# Patient Record
Sex: FEMALE | Race: WHITE | NOT HISPANIC OR LATINO | Employment: OTHER | URBAN - NONMETROPOLITAN AREA
[De-identification: names, ages, dates, MRNs, and addresses within clinical notes are randomized per-mention and may not be internally consistent; named-entity substitution may affect disease eponyms.]

---

## 2022-06-22 ENCOUNTER — APPOINTMENT (EMERGENCY)
Dept: RADIOLOGY | Facility: HOSPITAL | Age: 52
DRG: 720 | End: 2022-06-22
Payer: COMMERCIAL

## 2022-06-22 ENCOUNTER — HOSPITAL ENCOUNTER (INPATIENT)
Facility: HOSPITAL | Age: 52
LOS: 3 days | Discharge: HOME | DRG: 720 | End: 2022-06-27
Attending: EMERGENCY MEDICINE | Admitting: INTERNAL MEDICINE
Payer: COMMERCIAL

## 2022-06-22 DIAGNOSIS — L03.90 CELLULITIS: ICD-10-CM
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
## 2022-06-22 DIAGNOSIS — S61.451A DOG BITE OF HAND, RIGHT, INITIAL ENCOUNTER: Primary | ICD-10-CM

## 2022-06-22 PROBLEM — F31.9 BIPOLAR DISORDER (HCC): Status: ACTIVE | Noted: 2022-06-22

## 2022-06-22 PROBLEM — L08.9 DOG BITE OF RIGHT HAND WITH INFECTION: Status: ACTIVE | Noted: 2022-06-22

## 2022-06-22 LAB
ANION GAP SERPL CALCULATED.3IONS-SCNC: 11 MMOL/L (ref 4–13)
BASOPHILS # BLD AUTO: 0.02 THOUSANDS/ΜL (ref 0–0.1)
BASOPHILS NFR BLD AUTO: 0 % (ref 0–1)
BUN SERPL-MCNC: 16 MG/DL (ref 5–25)
CALCIUM SERPL-MCNC: 9.2 MG/DL (ref 8.3–10.1)
CHLORIDE SERPL-SCNC: 102 MMOL/L (ref 100–108)
CO2 SERPL-SCNC: 23 MMOL/L (ref 21–32)
CREAT SERPL-MCNC: 1.38 MG/DL (ref 0.6–1.3)
EOSINOPHIL # BLD AUTO: 0 THOUSAND/ΜL (ref 0–0.61)
EOSINOPHIL NFR BLD AUTO: 0 % (ref 0–6)
ERYTHROCYTE [DISTWIDTH] IN BLOOD BY AUTOMATED COUNT: 12.8 % (ref 11.6–15.1)
GFR SERPL CREATININE-BSD FRML MDRD: 44 ML/MIN/1.73SQ M
GLUCOSE P FAST SERPL-MCNC: 100 MG/DL (ref 65–99)
GLUCOSE SERPL-MCNC: 100 MG/DL (ref 65–140)
HCT VFR BLD AUTO: 42.4 % (ref 34.8–46.1)
HGB BLD-MCNC: 14.8 G/DL (ref 11.5–15.4)
IMM GRANULOCYTES # BLD AUTO: 0.09 THOUSAND/UL (ref 0–0.2)
IMM GRANULOCYTES NFR BLD AUTO: 1 % (ref 0–2)
LYMPHOCYTES # BLD AUTO: 1.38 THOUSANDS/ΜL (ref 0.6–4.47)
LYMPHOCYTES NFR BLD AUTO: 12 % (ref 14–44)
MCH RBC QN AUTO: 31.2 PG (ref 26.8–34.3)
MCHC RBC AUTO-ENTMCNC: 34.9 G/DL (ref 31.4–37.4)
MCV RBC AUTO: 90 FL (ref 82–98)
MONOCYTES # BLD AUTO: 0.92 THOUSAND/ΜL (ref 0.17–1.22)
MONOCYTES NFR BLD AUTO: 8 % (ref 4–12)
NEUTROPHILS # BLD AUTO: 9.22 THOUSANDS/ΜL (ref 1.85–7.62)
NEUTS SEG NFR BLD AUTO: 79 % (ref 43–75)
NRBC BLD AUTO-RTO: 0 /100 WBCS
PLATELET # BLD AUTO: 212 THOUSANDS/UL (ref 149–390)
PMV BLD AUTO: 8.7 FL (ref 8.9–12.7)
POTASSIUM SERPL-SCNC: 3.9 MMOL/L (ref 3.5–5.3)
RBC # BLD AUTO: 4.74 MILLION/UL (ref 3.81–5.12)
SODIUM SERPL-SCNC: 136 MMOL/L (ref 136–145)
WBC # BLD AUTO: 11.63 THOUSAND/UL (ref 4.31–10.16)

## 2022-06-22 PROCEDURE — 85025 COMPLETE CBC W/AUTO DIFF WBC: CPT | Performed by: PHYSICIAN ASSISTANT

## 2022-06-22 PROCEDURE — 80048 BASIC METABOLIC PNL TOTAL CA: CPT | Performed by: PHYSICIAN ASSISTANT

## 2022-06-22 PROCEDURE — 90715 TDAP VACCINE 7 YRS/> IM: CPT | Performed by: PHYSICIAN ASSISTANT

## 2022-06-22 PROCEDURE — 99219 PR INITIAL OBSERVATION CARE/DAY 50 MINUTES: CPT

## 2022-06-22 PROCEDURE — 99285 EMERGENCY DEPT VISIT HI MDM: CPT | Performed by: PHYSICIAN ASSISTANT

## 2022-06-22 PROCEDURE — 36415 COLL VENOUS BLD VENIPUNCTURE: CPT | Performed by: PHYSICIAN ASSISTANT

## 2022-06-22 PROCEDURE — 73130 X-RAY EXAM OF HAND: CPT

## 2022-06-22 PROCEDURE — 99284 EMERGENCY DEPT VISIT MOD MDM: CPT

## 2022-06-22 RX ORDER — OXYCODONE HYDROCHLORIDE 5 MG/1
5 TABLET ORAL EVERY 4 HOURS PRN
Status: DISCONTINUED | OUTPATIENT
Start: 2022-06-22 | End: 2022-06-27 | Stop reason: HOSPADM

## 2022-06-22 RX ORDER — ARIPIPRAZOLE 30 MG/1
30 TABLET ORAL DAILY
COMMUNITY

## 2022-06-22 RX ORDER — NICOTINE 21 MG/24HR
1 PATCH, TRANSDERMAL 24 HOURS TRANSDERMAL DAILY
Status: DISCONTINUED | OUTPATIENT
Start: 2022-06-23 | End: 2022-06-27 | Stop reason: HOSPADM

## 2022-06-22 RX ORDER — ACETAMINOPHEN 325 MG/1
650 TABLET ORAL EVERY 6 HOURS PRN
Status: DISCONTINUED | OUTPATIENT
Start: 2022-06-22 | End: 2022-06-22

## 2022-06-22 RX ORDER — GABAPENTIN 100 MG/1
100 CAPSULE ORAL 2 TIMES DAILY
Status: DISCONTINUED | OUTPATIENT
Start: 2022-06-22 | End: 2022-06-27 | Stop reason: HOSPADM

## 2022-06-22 RX ORDER — GABAPENTIN 100 MG/1
100 CAPSULE ORAL 3 TIMES DAILY
COMMUNITY

## 2022-06-22 RX ORDER — ARIPIPRAZOLE 10 MG/1
30 TABLET ORAL DAILY
Status: DISCONTINUED | OUTPATIENT
Start: 2022-06-23 | End: 2022-06-27 | Stop reason: HOSPADM

## 2022-06-22 RX ORDER — ONDANSETRON 2 MG/ML
4 INJECTION INTRAMUSCULAR; INTRAVENOUS EVERY 6 HOURS PRN
Status: DISCONTINUED | OUTPATIENT
Start: 2022-06-22 | End: 2022-06-27 | Stop reason: HOSPADM

## 2022-06-22 RX ORDER — DESVENLAFAXINE 50 MG/1
50 TABLET, EXTENDED RELEASE ORAL DAILY
Status: DISCONTINUED | OUTPATIENT
Start: 2022-06-23 | End: 2022-06-27 | Stop reason: HOSPADM

## 2022-06-22 RX ORDER — HYDROXYZINE PAMOATE 50 MG/1
50 CAPSULE ORAL 3 TIMES DAILY PRN
COMMUNITY

## 2022-06-22 RX ORDER — ACETAMINOPHEN 325 MG/1
650 TABLET ORAL EVERY 4 HOURS PRN
Status: DISCONTINUED | OUTPATIENT
Start: 2022-06-22 | End: 2022-06-27 | Stop reason: HOSPADM

## 2022-06-22 RX ORDER — TOPIRAMATE 50 MG/1
50 TABLET, FILM COATED ORAL
COMMUNITY

## 2022-06-22 RX ORDER — HYDROXYZINE HYDROCHLORIDE 25 MG/1
50 TABLET, FILM COATED ORAL EVERY 6 HOURS PRN
Status: DISCONTINUED | OUTPATIENT
Start: 2022-06-22 | End: 2022-06-27 | Stop reason: HOSPADM

## 2022-06-22 RX ORDER — OXYCODONE HYDROCHLORIDE 10 MG/1
10 TABLET ORAL EVERY 4 HOURS PRN
Status: DISCONTINUED | OUTPATIENT
Start: 2022-06-22 | End: 2022-06-27 | Stop reason: HOSPADM

## 2022-06-22 RX ORDER — HEPARIN SODIUM 5000 [USP'U]/ML
5000 INJECTION, SOLUTION INTRAVENOUS; SUBCUTANEOUS EVERY 8 HOURS SCHEDULED
Status: DISCONTINUED | OUTPATIENT
Start: 2022-06-22 | End: 2022-06-27 | Stop reason: HOSPADM

## 2022-06-22 RX ORDER — KETOROLAC TROMETHAMINE 30 MG/ML
15 INJECTION, SOLUTION INTRAMUSCULAR; INTRAVENOUS ONCE
Status: COMPLETED | OUTPATIENT
Start: 2022-06-22 | End: 2022-06-22

## 2022-06-22 RX ORDER — TOPIRAMATE 100 MG/1
100 TABLET, FILM COATED ORAL DAILY
COMMUNITY

## 2022-06-22 RX ORDER — DESVENLAFAXINE 50 MG/1
50 TABLET, EXTENDED RELEASE ORAL DAILY
COMMUNITY

## 2022-06-22 RX ADMIN — TETANUS TOXOID, REDUCED DIPHTHERIA TOXOID AND ACELLULAR PERTUSSIS VACCINE, ADSORBED 0.5 ML: 5; 2.5; 8; 8; 2.5 SUSPENSION INTRAMUSCULAR at 21:12

## 2022-06-22 RX ADMIN — SODIUM CHLORIDE 1000 ML: 0.9 INJECTION, SOLUTION INTRAVENOUS at 20:52

## 2022-06-22 RX ADMIN — GABAPENTIN 100 MG: 100 CAPSULE ORAL at 21:41

## 2022-06-22 RX ADMIN — HEPARIN SODIUM 5000 UNITS: 5000 INJECTION INTRAVENOUS; SUBCUTANEOUS at 22:23

## 2022-06-22 RX ADMIN — KETOROLAC TROMETHAMINE 15 MG: 30 INJECTION, SOLUTION INTRAMUSCULAR at 20:48

## 2022-06-22 RX ADMIN — AMPICILLIN SODIUM AND SULBACTAM SODIUM 3 G: 2; 1 INJECTION, POWDER, FOR SOLUTION INTRAMUSCULAR; INTRAVENOUS at 20:59

## 2022-06-23 PROBLEM — A41.9 SEPSIS (HCC): Status: ACTIVE | Noted: 2022-06-23

## 2022-06-23 LAB
ALBUMIN SERPL BCP-MCNC: 3.1 G/DL (ref 3.5–5)
ALP SERPL-CCNC: 61 U/L (ref 46–116)
ALT SERPL W P-5'-P-CCNC: 26 U/L (ref 12–78)
ANION GAP SERPL CALCULATED.3IONS-SCNC: 9 MMOL/L (ref 4–13)
AST SERPL W P-5'-P-CCNC: 24 U/L (ref 5–45)
BASOPHILS # BLD AUTO: 0.01 THOUSANDS/ΜL (ref 0–0.1)
BASOPHILS NFR BLD AUTO: 0 % (ref 0–1)
BILIRUB SERPL-MCNC: 0.58 MG/DL (ref 0.2–1)
BUN SERPL-MCNC: 15 MG/DL (ref 5–25)
CALCIUM ALBUM COR SERPL-MCNC: 9.3 MG/DL (ref 8.3–10.1)
CALCIUM SERPL-MCNC: 8.6 MG/DL (ref 8.3–10.1)
CHLORIDE SERPL-SCNC: 108 MMOL/L (ref 100–108)
CO2 SERPL-SCNC: 21 MMOL/L (ref 21–32)
CREAT SERPL-MCNC: 1.11 MG/DL (ref 0.6–1.3)
EOSINOPHIL # BLD AUTO: 0 THOUSAND/ΜL (ref 0–0.61)
EOSINOPHIL NFR BLD AUTO: 0 % (ref 0–6)
ERYTHROCYTE [DISTWIDTH] IN BLOOD BY AUTOMATED COUNT: 12.9 % (ref 11.6–15.1)
GFR SERPL CREATININE-BSD FRML MDRD: 58 ML/MIN/1.73SQ M
GLUCOSE SERPL-MCNC: 110 MG/DL (ref 65–140)
HCT VFR BLD AUTO: 38.5 % (ref 34.8–46.1)
HGB BLD-MCNC: 13.3 G/DL (ref 11.5–15.4)
IMM GRANULOCYTES # BLD AUTO: 0.02 THOUSAND/UL (ref 0–0.2)
IMM GRANULOCYTES NFR BLD AUTO: 0 % (ref 0–2)
LYMPHOCYTES # BLD AUTO: 1.77 THOUSANDS/ΜL (ref 0.6–4.47)
LYMPHOCYTES NFR BLD AUTO: 21 % (ref 14–44)
MCH RBC QN AUTO: 31.1 PG (ref 26.8–34.3)
MCHC RBC AUTO-ENTMCNC: 34.5 G/DL (ref 31.4–37.4)
MCV RBC AUTO: 90 FL (ref 82–98)
MONOCYTES # BLD AUTO: 0.74 THOUSAND/ΜL (ref 0.17–1.22)
MONOCYTES NFR BLD AUTO: 9 % (ref 4–12)
NEUTROPHILS # BLD AUTO: 5.72 THOUSANDS/ΜL (ref 1.85–7.62)
NEUTS SEG NFR BLD AUTO: 70 % (ref 43–75)
NRBC BLD AUTO-RTO: 0 /100 WBCS
PLATELET # BLD AUTO: 175 THOUSANDS/UL (ref 149–390)
PMV BLD AUTO: 9.3 FL (ref 8.9–12.7)
POTASSIUM SERPL-SCNC: 4 MMOL/L (ref 3.5–5.3)
PROT SERPL-MCNC: 6.6 G/DL (ref 6.4–8.2)
RBC # BLD AUTO: 4.27 MILLION/UL (ref 3.81–5.12)
SODIUM SERPL-SCNC: 138 MMOL/L (ref 136–145)
WBC # BLD AUTO: 8.26 THOUSAND/UL (ref 4.31–10.16)

## 2022-06-23 PROCEDURE — 99225 PR SBSQ OBSERVATION CARE/DAY 25 MINUTES: CPT | Performed by: PHYSICIAN ASSISTANT

## 2022-06-23 PROCEDURE — 80053 COMPREHEN METABOLIC PANEL: CPT

## 2022-06-23 PROCEDURE — 36415 COLL VENOUS BLD VENIPUNCTURE: CPT

## 2022-06-23 PROCEDURE — 85025 COMPLETE CBC W/AUTO DIFF WBC: CPT

## 2022-06-23 RX ADMIN — ARIPIPRAZOLE 30 MG: 10 TABLET ORAL at 08:03

## 2022-06-23 RX ADMIN — AMPICILLIN SODIUM AND SULBACTAM SODIUM 3 G: 2; 1 INJECTION, POWDER, FOR SOLUTION INTRAMUSCULAR; INTRAVENOUS at 18:12

## 2022-06-23 RX ADMIN — GABAPENTIN 100 MG: 100 CAPSULE ORAL at 18:12

## 2022-06-23 RX ADMIN — OXYCODONE HYDROCHLORIDE 5 MG: 5 TABLET ORAL at 21:04

## 2022-06-23 RX ADMIN — DESVENLAFAXINE 50 MG: 50 TABLET, FILM COATED, EXTENDED RELEASE ORAL at 08:03

## 2022-06-23 RX ADMIN — ACETAMINOPHEN 650 MG: 325 TABLET ORAL at 10:19

## 2022-06-23 RX ADMIN — AMPICILLIN SODIUM AND SULBACTAM SODIUM 3 G: 2; 1 INJECTION, POWDER, FOR SOLUTION INTRAMUSCULAR; INTRAVENOUS at 02:50

## 2022-06-23 RX ADMIN — AMPICILLIN SODIUM AND SULBACTAM SODIUM 3 G: 2; 1 INJECTION, POWDER, FOR SOLUTION INTRAMUSCULAR; INTRAVENOUS at 09:11

## 2022-06-23 RX ADMIN — HEPARIN SODIUM 5000 UNITS: 5000 INJECTION INTRAVENOUS; SUBCUTANEOUS at 14:02

## 2022-06-23 RX ADMIN — HEPARIN SODIUM 5000 UNITS: 5000 INJECTION INTRAVENOUS; SUBCUTANEOUS at 21:05

## 2022-06-23 RX ADMIN — TOPIRAMATE 150 MG: 100 TABLET, FILM COATED ORAL at 08:03

## 2022-06-23 RX ADMIN — NICOTINE 1 PATCH: 21 PATCH, EXTENDED RELEASE TRANSDERMAL at 08:02

## 2022-06-23 RX ADMIN — GABAPENTIN 100 MG: 100 CAPSULE ORAL at 08:03

## 2022-06-23 RX ADMIN — HEPARIN SODIUM 5000 UNITS: 5000 INJECTION INTRAVENOUS; SUBCUTANEOUS at 06:29

## 2022-06-23 NOTE — UTILIZATION REVIEW
OBSERVATION 6/22/22 @ 2045 CONVERTED TO INPATIENT [email or web address] DUE TO HAND INFECTION FROM DOG BIT, WITH SEPSIS REQUIRING IV AB  Initial Clinical Review    Admission: Date/Time/Statement:   Admission Orders (From admission, onward)     Ordered        06/24/22 1517  Inpatient Admission  Once                      Orders Placed This Encounter   Procedures    Inpatient Admission     Standing Status:   Standing     Number of Occurrences:   1     Order Specific Question:   Level of Care     Answer:   Med Surg [16]     Order Specific Question:   Estimated length of stay     Answer:   More than 2 Midnights     Order Specific Question:   Certification     Answer:   I certify that inpatient services are medically necessary for this patient for a duration of greater than two midnights  See H&P and MD Progress Notes for additional information about the patient's course of treatment  ED Arrival Information     Expected   -    Arrival   6/22/2022 20:09    Acuity   Urgent            Means of arrival   Walk-In    Escorted by   Family Member    Service   Hospitalist    Admission type   Urgent            Arrival complaint   dog bite            Chief Complaint   Patient presents with    Dog Bite     Bit by her own dog this morning  Dog UTD on all vaccinations and rabbies  Patient did clean wound generously with water and betadine       Initial Presentation: 54 y o  female the ED from home with complaints of wound to right hand from breaking up dog fight which happened the morning of her arrival   Redness increased throughout the day  Difficulty with ROM  Dog UTD with vaccinations  Admitted under observation then converted to inpatient  for dog bite right hand with infection  WBCs elevated  IV abx started  Warm soaks  Date:   6/23   Hand wound feels mildly better, remains red with significant pain and unable to move  Continue with Iv abx    6/24 Update: Intermittent tachycardia    Continue with pain medication and IV abx    ED Triage Vitals   Temperature Pulse Respirations Blood Pressure SpO2   06/22/22 2020 06/22/22 2019 06/22/22 2019 06/22/22 2019 06/22/22 2019   98 °F (36 7 °C) 100 18 137/95 98 %      Temp Source Heart Rate Source Patient Position - Orthostatic VS BP Location FiO2 (%)   06/23/22 1348 -- 06/22/22 2019 06/22/22 2019 --   Temporal  Sitting Left arm       Pain Score       06/22/22 2048       8          Wt Readings from Last 1 Encounters:   06/24/22 68 kg (149 lb 14 6 oz)     Additional Vital Signs:   Time Temp Pulse Resp BP MAP (mmHg) SpO2 O2 Device Patient Position - Orthostatic VS   06/24/22 1300 98 °F (36 7 °C) -- -- 118/72 -- -- None (Room air) Lying   06/24/22 1238 -- -- -- -- -- -- None (Room air) --   06/24/22 07:05:45 98 °F (36 7 °C) 115 Abnormal  18 119/76 90 98 % None (Room air) Lying   06/23/22 2330 -- -- -- -- -- -- None (Room air) --   06/23/22 22:08:57 98 1 °F (36 7 °C) 103 18 114/72 86 98 % None (Room air)        /Time Temp Pulse Resp BP SpO2 O2 Device Patient Position - Orthostatic VS   06/23/22 0800 99 1 °F (37 3 °C) 86 18 132/84 97 % None (Room air) Lying   06/22/22 2020 98 °F (36 7 °C) -- -- -- -- -- --   06/22/22 2019 -- 100 18 137/95 98 % None (Room air) Sitting       Pertinent Labs/Diagnostic Test Results:     XR hand 3+ views RIGHT   ED Interpretation by Adolfo Menjivar PA-C (06/22 2100)   No fracture dislocation      Final Result by Kaiden Frye MD (06/23 0827)     IMPRESSION:      No acute osseous abnormality              Workstation performed: [id number]         MRI inpatient order    (Results Pending)         Results from last 7 days   Lab Units 06/24/22  1001 06/23/22  0629 06/22/22 2053   WBC Thousand/uL 6 50 8 26 11 63*   HEMOGLOBIN g/dL 15 6* 13 3 14 8   HEMATOCRIT % 46 4* 38 5 42 4   PLATELETS Thousands/uL 166 175 212   NEUTROS ABS Thousands/µL 4 63 5 72 9 22*         Results from last 7 days   Lab Units 06/24/22  1001 06/23/22  0629 06/22/22  2053   SODIUM mmol/L 137 138 136   POTASSIUM mmol/L 3 9 4 0 3 9   CHLORIDE mmol/L 104 108 102   CO2 mmol/L 22 21 23   ANION GAP mmol/L 11 9 11   BUN mg/dL 14 15 16   CREATININE mg/dL 1 09 1 11 1 38*   EGFR ml/min/1 73sq m 59 58 44   CALCIUM mg/dL 9 4 8 6 9 2     Results from last 7 days   Lab Units 06/23/22  0629   AST U/L 24   ALT U/L 26   ALK PHOS U/L 61   TOTAL PROTEIN g/dL 6 6   ALBUMIN g/dL 3 1*   TOTAL BILIRUBIN mg/dL 0 58         Results from last 7 days   Lab Units 06/24/22  1001 06/23/22  0629 06/22/22 2053   GLUCOSE RANDOM mg/dL 114 110 100       ED Treatment:   Medication Administration from 06/22/2022 2009 to 06/23/2022 0846       Date/Time Order Dose Route Action     06/22/2022 2112 tetanus-diphtheria-acellular pertussis (BOOSTRIX) IM injection 0 5 mL 0 5 mL Intramuscular Given     06/22/2022 2059 ampicillin-sulbactam (UNASYN) 3 g in sodium chloride 0 9 % 100 mL IVPB 3 g Intravenous New Bag     06/22/2022 2048 ketorolac (TORADOL) injection 15 mg 15 mg Intravenous Given     06/22/2022 2052 sodium chloride 0 9 % bolus 1,000 mL 1,000 mL Intravenous New Bag     06/23/2022 0803 ARIPiprazole (ABILIFY) tablet 30 mg 30 mg Oral Given     06/23/2022 0803 desvenlafaxine succinate (PRISTIQ) 24 hr tablet 50 mg 50 mg Oral Given     06/23/2022 0803 gabapentin (NEURONTIN) capsule 100 mg 100 mg Oral Given     06/22/2022 2141 gabapentin (NEURONTIN) capsule 100 mg 100 mg Oral Given     06/23/2022 0803 topiramate (TOPAMAX) tablet 150 mg 150 mg Oral Given     06/23/2022 0802 nicotine (NICODERM CQ) 21 mg/24 hr TD 24 hr patch 1 patch 1 patch Transdermal Medication Applied     06/23/2022 0629 heparin (porcine) subcutaneous injection 5,000 Units 5,000 Units Subcutaneous Given     06/22/2022 2223 heparin (porcine) subcutaneous injection 5,000 Units 5,000 Units Subcutaneous Given     06/23/2022 0250 ampicillin-sulbactam (UNASYN) 3 g in sodium chloride 0 9 % 100 mL IVPB 3 g Intravenous New Bag          Admitting Diagnosis: Cellulitis [L03 90]  Animal bite [T14  8XXA]  Age/Sex: 54 y o  female  Admission Orders:  Scheduled Medications:  ampicillin-sulbactam, 3 g, Intravenous, Q6H  ARIPiprazole, 30 mg, Oral, Daily  desvenlafaxine succinate, 50 mg, Oral, Daily  gabapentin, 100 mg, Oral, BID  heparin (porcine), 5,000 Units, Subcutaneous, Q8H VAIBHAV  nicotine, 1 patch, Transdermal, Daily  topiramate, 150 mg, Oral, Daily    Continuous IV Infusions:     PRN Meds:  acetaminophen, 650 mg, Oral, Q4H PRN  hydrOXYzine HCL, 50 mg, Oral, Q6H PRN  morphine injection, 2 mg, Intravenous, Q1H PRN  ondansetron, 4 mg, Intravenous, Q6H PRN  oxyCODONE, 10 mg, Oral, Q4H PRN  oxyCODONE, 5 mg, Oral, Q4H PRN        None    Network Utilization Review Department  ATTENTION: Please call with any questions or concerns to [phone] and carefully listen to the prompts so that you are directed to the right person  All voicemails are confidential   Winsome Viveros all requests for admission clinical reviews, approved or denied determinations and any other requests to dedicated fax number below belonging to the campus where the patient is receiving treatment   List of dedicated fax numbers for the Facilities:  [address] DENIALS (Administrative/Medical Necessity) [phone]   [address] (Maternity/NICU/Pediatrics) [phone]   [address] [place] [address]  [address] [facility] [place] [address] [address] [place] [address] [address] [facility] [phone]

## 2022-06-23 NOTE — OCCUPATIONAL THERAPY NOTE
Occupational Therapy         Patient Name: Rico Young  FZDLG'S Date: 6/23/2022 06/23/22 0825   OT Last Visit   OT Visit Date 06/23/22   Note Type   Note type Screen   Additional Comments Order received and chart review performed  Per nursing report, pt is performing functional mobility independently with no device, is IND with self cares within her room/restroom  Pt does not require skilled OT intervention at this time; will d/c OT orders       Antolin Rivas OT

## 2022-06-23 NOTE — ASSESSMENT & PLAN NOTE
· Patient presented after being bit by her Dog earlier today around 10am   She reports that following the bite she developed significant erythema of the right hand as well as swelling in the dorsal portion which prevents her from being able to extend her fingers  She also reports significant pain  · She states that her dogs were up-to-date on her their shots including rabies  · No fractures noted on x-ray of the right hand performed  · White blood cells elevated at 11 63 - now wnl  · Patient administered Boostrix in ED  · ED provider discussed with Hand surgery who stated that most likely the patient's inability to move her fingers is due to the swelling and not a specific extensor injury    Recommended admission here IV abx  · Continue with Unasyn 3 g IV q 6  · Warm water soaks of right hand t i d   · No further imaging indicated at this time per radiology  · Will monitor on abx, if clinically appears to worsen, will recontact hand surgery and complete CT hand

## 2022-06-23 NOTE — ASSESSMENT & PLAN NOTE
· Patient presented after being bit by her Dog earlier today around 10am   She reports that following the bite she developed significant erythema of the right hand as well as swelling in the dorsal portion which prevents her from being able to extend her fingers  She also reports significant pain  · She states that her dogs were up-to-date on her their shots including rabies  · No fractures noted on x-ray of the right hand performed in ED to [facility] or the ED providers read  Will wait for the radiology read  · White blood cells elevated at 11 63  · Patient administered Boostrix in ED  · ED provider discussed with Hand surgery who stated that most likely the patient's inability to move her fingers is due to the swelling and not a specific extensor injury    Recommended admission here for ortho eval tomorrow and IV antibiotics  · Patient received 1 dose of Unasyn in the ED  · Continue with Unasyn 3 g IV q 6  · Warm water soaks of right hand t i d   · Orthopedics consult  · Will provide pain control

## 2022-06-23 NOTE — PROGRESS NOTES
[address] [facility]  Progress Note - Elvis Jonas 1970, 54 y o  female MRN: [id number]  Unit/Bed#: 410-01 Encounter: [id number]  Primary Care Provider: No primary care provider on file  Date and time admitted to hospital: 6/22/2022  8:17 PM    * Dog bite of right hand with infection  Assessment & Plan  · Patient presented after being bit by her Dog earlier today around 10am   She reports that following the bite she developed significant erythema of the right hand as well as swelling in the dorsal portion which prevents her from being able to extend her fingers  She also reports significant pain  · She states that her dogs were up-to-date on her their shots including rabies  · No fractures noted on x-ray of the right hand performed  · White blood cells elevated at 11 63 - now wnl  · Patient administered Boostrix in ED  · ED provider discussed with Hand surgery who stated that most likely the patient's inability to move her fingers is due to the swelling and not a specific extensor injury  Recommended admission here IV abx  · Continue with Unasyn 3 g IV q 6  · Warm water soaks of right hand t i d   · No further imaging indicated at this time per radiology  · Will monitor on abx, if clinically appears to worsen, will recontact hand surgery and complete CT hand    Sepsis ([facility] [address] )  Assessment & Plan  · POA with leukocytosis and tachycardia  · Secondary to what appears to be an infected dog bite of her right hand  · Being treated with IV abx  · No blood cultures obtained  · Sepsis appears resolved at this time    Bipolar disorder (HCC)  Assessment & Plan  · Continue Abilify 30 mg oral daily  · Continue Pristiq 50 mg oral daily  · Continue Topamax 150 mg oral daily        VTE Pharmacologic Prophylaxis: VTE Score: 10 High Risk (Score >/= 5) - Pharmacological DVT Prophylaxis Ordered: heparin  Sequential Compression Devices Ordered      Patient Centered Rounds: I performed bedside rounds with nursing staff today   Discussions with Specialists or Other Care Team Provider: case management, orthopedics    Education and Discussions with Family / Patient: Patient declined call to   Time Spent for Care: 30 minutes  More than 50% of total time spent on counseling and coordination of care as described above  Current Length of Stay: 0 day(s)  Current Patient Status: Observation   Certification Statement: The patient will continue to require additional inpatient hospital stay due to IV abx  Discharge Plan: Anticipate discharge in 48 hrs to home  Code Status: Level 1 - Full Code    Subjective:   Patient reports that her hand feels mildly better but still having significant pain and cannot move it     Objective:     Vitals:   Temp (24hrs), Av 3 °F (36 8 °C), Min:97 9 °F (36 6 °C), Max:99 1 °F (37 3 °C)    Temp:  [97 9 °F (36 6 °C)-99 1 °F (37 3 °C)] 97 9 °F (36 6 °C)  HR:  [] 96  Resp:  [18] 18  BP: (108-137)/(70-95) 108/72  SpO2:  [96 %-98 %] 98 %  Body mass index is 25 15 kg/m²  Input and Output Summary (last 24 hours):   No intake or output data in the 24 hours ending 22 1706    Physical Exam:   Physical Exam  Vitals and nursing note reviewed  Constitutional:       General: She is not in acute distress  Appearance: She is not ill-appearing  HENT:      Head: Normocephalic and atraumatic  Cardiovascular:      Rate and Rhythm: Normal rate and regular rhythm  Pulses: Normal pulses  Heart sounds: Normal heart sounds  Pulmonary:      Effort: Pulmonary effort is normal       Breath sounds: Normal breath sounds  Abdominal:      General: Bowel sounds are normal       Palpations: Abdomen is soft  Tenderness: There is no abdominal tenderness  There is no guarding or rebound  Musculoskeletal:      Cervical back: Normal range of motion and neck supple        Comments: Poor ROM of right hand   Skin:     Comments: Right hand swollen and erythematous   Neurological: General: No focal deficit present  Mental Status: She is alert and oriented to person, place, and time     Psychiatric:         Mood and Affect: Mood normal          Behavior: Behavior normal           Additional Data:     Labs:  Results from last 7 days   Lab Units 06/23/22  0629   WBC Thousand/uL 8 26   HEMOGLOBIN g/dL 13 3   HEMATOCRIT % 38 5   PLATELETS Thousands/uL 175   NEUTROS PCT % 70   LYMPHS PCT % 21   MONOS PCT % 9   EOS PCT % 0     Results from last 7 days   Lab Units 06/23/22  0629   SODIUM mmol/L 138   POTASSIUM mmol/L 4 0   CHLORIDE mmol/L 108   CO2 mmol/L 21   BUN mg/dL 15   CREATININE mg/dL 1 11   ANION GAP mmol/L 9   CALCIUM mg/dL 8 6   ALBUMIN g/dL 3 1*   TOTAL BILIRUBIN mg/dL 0 58   ALK PHOS U/L 61   ALT U/L 26   AST U/L 24   GLUCOSE RANDOM mg/dL 110                       Lines/Drains:  Invasive Devices  Report    Peripheral Intravenous Line  Duration           Peripheral IV 06/22/22 Left;Proximal;Ventral (anterior) Forearm <1 day                      Imaging: Reviewed radiology reports from this admission including: xray(s)    Recent Cultures (last 7 days):         Last 24 Hours Medication List:   Current Facility-Administered Medications   Medication Dose Route Frequency Provider Last Rate    acetaminophen  650 mg Oral Q4H PRN Olga Soto PA-C      ampicillin-sulbactam  3 g Intravenous Q6H Patricia Pena PA-C      ARIPiprazole  30 mg Oral Daily Olga Soto PA-C      desvenlafaxine succinate  50 mg Oral Daily Olga Soto PA-C      gabapentin  100 mg Oral BID Olga Soto PA-C      heparin (porcine)  5,000 Units Subcutaneous Q8H [facility] Toni Doan PA-C      hydrOXYzine HCL  50 mg Oral Q6H PRN Olga Soto PA-C      morphine injection  2 mg Intravenous Q1H PRN Olga Soto PA-C      nicotine  1 patch Transdermal Daily Olga Soto PA-C      ondansetron  4 mg Intravenous Q6H PRN Olga Soto PA-C      oxyCODONE  10 mg Oral Q4H PRN Rianna Lyman PA-C      oxyCODONE  5 mg Oral Q4H PRN Rianna Lyman PA-C      topiramate  150 mg Oral Daily Rianna Lyman PA-C          Today, Patient Was Seen By: Taylor Bernal PA-C    **Please Note: This note may have been constructed using a voice recognition system  **

## 2022-06-23 NOTE — ASSESSMENT & PLAN NOTE
· Continue Abilify 30 mg oral daily  · Continue Pristiq 50 mg oral daily  · Continue Topamax 150 mg oral daily

## 2022-06-23 NOTE — H&P
[address] [facility] [address] [place]  H&P- Darlys Fuse 1970, 54 y o  female MRN: [id number]  Unit/Bed#: RM01 Encounter: [id number]  Primary Care Provider: No primary care provider on file  Date and time admitted to hospital: 6/22/2022  8:17 PM    * Dog bite of right hand with infection  Assessment & Plan  · Patient presented after being bit by her Dog earlier today around 10am   She reports that following the bite she developed significant erythema of the right hand as well as swelling in the dorsal portion which prevents her from being able to extend her fingers  She also reports significant pain  · She states that her dogs were up-to-date on her their shots including rabies  · No fractures noted on x-ray of the right hand performed in ED to [facility] or the ED providers read  Will wait for the radiology read  · White blood cells elevated at 11 63  · Patient administered Boostrix in ED  · ED provider discussed with Hand surgery who stated that most likely the patient's inability to move her fingers is due to the swelling and not a specific extensor injury  Recommended admission here for ortho eval tomorrow and IV antibiotics  · Patient received 1 dose of Unasyn in the ED  · Continue with Unasyn 3 g IV q 6  · Warm water soaks of right hand t i d   · Orthopedics consult  · Will provide pain control    Bipolar disorder (HCC)  Assessment & Plan  · Continue Abilify 30 mg oral daily  · Continue Pristiq 50 mg oral daily  · Continue Topamax 150 mg oral daily    VTE Pharmacologic Prophylaxis: VTE Score: 10 High Risk (Score >/= 5) - Pharmacological DVT Prophylaxis Ordered: heparin  Sequential Compression Devices Ordered  Code Status: Level 1 - Full Code   Discussion with family: Updated  () at bedside  Anticipated Length of Stay: Patient will be admitted on an observation basis with an anticipated length of stay of less than 2 midnights secondary to Dog bite of right hand      Total Time for Visit, including Counseling / Coordination of Care: 45 minutes Greater than 50% of this total time spent on direct patient counseling and coordination of care  Chief Complaint: Dog bite of right hand     History of Present Illness:  Lexi Winters is a 54 y o  female with a PMH of bipolar disorder, tobacco abuse who presents with hand swelling and erythema times 5 hours following a dog bite  The patient states that around 10 30 this morning she was breaking up her to Beagles which were fighting  She states that when she pulled 1 off of the other she was bit on the dorsal side of her hand  She has been on her right hand  She is right-hand dominant  She does state that both of her dogs are up-to-date on vaccinations including rabies  She is not sure when her last tetanus is  She states that throughout the day, the swelling has increased as well as redness  She states that she is now having difficulty with range of motion of her fingers and any passive range of motion causes pain of the 2nd 3rd and 4th fingers  She is having difficulty with extending her fingers  She did state that she cleaned her wound generously with water and Betadine at home  The case was discussed by the ED provider with Hand surgery who recommended admission here for IV antibiotics and warm soaks as well as a consult with Orthopedics in a   Hand surgery states that there was little concern for tenosynovitis on the dorsal side of the hand with the extensors  Most likely the difficulty with range of motion is due to the swelling of the dorsal side of the hand  Review of Systems:  Review of Systems   Constitutional: Negative for chills, fatigue and fever  HENT: Negative for ear pain and sore throat  Eyes: Negative for pain and visual disturbance  Respiratory: Negative for cough, shortness of breath and wheezing  Cardiovascular: Negative for chest pain, palpitations and leg swelling     Gastrointestinal: Negative for abdominal distention, abdominal pain, diarrhea, nausea and vomiting  Endocrine: Negative for polydipsia and polyuria  Genitourinary: Negative for dysuria and hematuria  Musculoskeletal: Positive for myalgias  Negative for arthralgias and back pain  Skin: Positive for color change and wound  Negative for rash  Neurological: Negative for dizziness, seizures, syncope, facial asymmetry, weakness, light-headedness, numbness and headaches  Psychiatric/Behavioral: Negative for agitation and confusion  All other systems reviewed and are negative  Past Medical and Surgical History:   History reviewed  No pertinent past medical history  History reviewed  No pertinent surgical history  Meds/Allergies:  Prior to Admission medications    Medication Sig Start Date End Date Taking? Authorizing Provider   ARIPiprazole (ABILIFY) 30 mg tablet Take 30 mg by mouth daily   Yes Historical Provider, MD   desvenlafaxine succinate (PRISTIQ) 50 mg 24 hr tablet Take 50 mg by mouth daily   Yes Historical Provider, MD   gabapentin (NEURONTIN) 100 mg capsule Take 100 mg by mouth 2 (two) times a day   Yes Historical Provider, MD   hydrOXYzine pamoate (VISTARIL) 50 mg capsule Take 50 mg by mouth 3 (three) times a day as needed for itching   Yes Historical Provider, MD   topiramate (TOPAMAX) 50 MG tablet Take 50 mg by mouth daily at bedtime as needed   Yes Historical Provider, MD   topiramate (TOPAMAX) 100 mg tablet Take 100 mg by mouth daily    Historical Provider, MD     I have reviewed home medications with patient personally      Allergies: No Known Allergies    Social History:  Marital Status: Single   Occupation: none   Patient Pre-hospital Living Situation: Home  Patient Pre-hospital Level of Mobility: walks  Patient Pre-hospital Diet Restrictions: none   Substance Use History:   Social History     Substance and Sexual Activity   Alcohol Use None     Social History     Tobacco Use   Smoking Status Current Every Day Smoker    Packs/day: 0 50    Types: Cigarettes   Smokeless Tobacco Never Used     Social History     Substance and Sexual Activity   Drug Use Not on file       Family History:  History reviewed  No pertinent family history  Physical Exam:     Vitals:   Blood Pressure: 137/95 (06/22/22 2019)  Pulse: 100 (06/22/22 2019)  Temperature: 98 °F (36 7 °C) (06/22/22 2020)  Respirations: 18 (06/22/22 2019)  Height: 5' 3" (160 cm) (06/22/22 2019)  Weight - Scale: 64 4 kg (142 lb) (06/22/22 2019)  SpO2: 98 % (06/22/22 2019)    Physical Exam  Vitals and nursing note reviewed  Constitutional:       General: She is not in acute distress  Appearance: Normal appearance  She is well-developed  She is not ill-appearing  HENT:      Head: Normocephalic and atraumatic  Nose: Nose normal  No congestion or rhinorrhea  Mouth/Throat:      Mouth: Mucous membranes are moist       Pharynx: Oropharynx is clear  No oropharyngeal exudate or posterior oropharyngeal erythema  Eyes:      General: No scleral icterus  Right eye: No discharge  Left eye: No discharge  Extraocular Movements: Extraocular movements intact  Conjunctiva/sclera: Conjunctivae normal       Pupils: Pupils are equal, round, and reactive to light  Cardiovascular:      Rate and Rhythm: Normal rate and regular rhythm  Pulses: Normal pulses  Heart sounds: Normal heart sounds  No murmur heard  No friction rub  No gallop  Pulmonary:      Effort: Pulmonary effort is normal  No respiratory distress  Breath sounds: Normal breath sounds  No wheezing, rhonchi or rales  Abdominal:      General: Abdomen is flat  Bowel sounds are normal  There is no distension  Palpations: Abdomen is soft  Tenderness: There is no abdominal tenderness  There is no right CVA tenderness, left CVA tenderness or guarding  Musculoskeletal:         General: Swelling, tenderness and signs of injury present        Right hand: Swelling, laceration and tenderness present  Decreased range of motion  Left hand: Normal       Cervical back: Normal range of motion and neck supple  No rigidity or tenderness  Comments: See below for photo of right hand  Skin:     General: Skin is warm and dry  Capillary Refill: Capillary refill takes less than 2 seconds  Neurological:      General: No focal deficit present  Mental Status: She is alert and oriented to person, place, and time  Mental status is at baseline  Cranial Nerves: No cranial nerve deficit  Sensory: No sensory deficit  Motor: No weakness  Psychiatric:         Mood and Affect: Mood normal          Behavior: Behavior normal               Additional Data:     Lab Results:  Results from last 7 days   Lab Units 06/22/22 2053   WBC Thousand/uL 11 63*   HEMOGLOBIN g/dL 14 8   HEMATOCRIT % 42 4   PLATELETS Thousands/uL 212   NEUTROS PCT % 79*   LYMPHS PCT % 12*   MONOS PCT % 8   EOS PCT % 0     Results from last 7 days   Lab Units 06/22/22 2053   SODIUM mmol/L 136   POTASSIUM mmol/L 3 9   CHLORIDE mmol/L 102   CO2 mmol/L 23   BUN mg/dL 16   CREATININE mg/dL 1 38*   ANION GAP mmol/L 11   CALCIUM mg/dL 9 2   GLUCOSE RANDOM mg/dL 100                       Imaging: Personally reviewed the following imaging: xray(s)  XR hand 3+ views RIGHT   ED Interpretation by Lora Packer PA-C (06/22 2100)   No fracture dislocation          EKG and Other Studies Reviewed on Admission:   · EKG: No EKG obtained  ** Please Note: This note has been constructed using a voice recognition system   **

## 2022-06-23 NOTE — PHYSICAL THERAPY NOTE
PHYSICAL THERAPY      Patient Name: David Patel  [id number] Date: 6/23/2022 06/23/22 0338   PT Last Visit   PT Visit Date 06/23/22   Note Type   Note type Screen   Additional Comments Order received and chart review performed  Per nursing report, pt is performing functional mobility independently with no device  Pt does not require skilled PT intervention at this time; will d/c PT orders         Stefanie Heath PT

## 2022-06-23 NOTE — PLAN OF CARE
Problem: PAIN - ADULT  Goal: Verbalizes/displays adequate comfort level or baseline comfort level  Description: Interventions:  - Encourage patient to monitor pain and request assistance  - Assess pain using appropriate pain scale  - Administer analgesics based on type and severity of pain and evaluate response  - Implement non-pharmacological measures as appropriate and evaluate response  - Consider cultural and social influences on pain and pain management  - Notify physician/advanced practitioner if interventions unsuccessful or patient reports new pain  Outcome: Progressing     Problem: INFECTION - ADULT  Goal: Absence or prevention of progression during hospitalization  Description: INTERVENTIONS:  - Assess and monitor for signs and symptoms of infection  - Monitor lab/diagnostic results  - Monitor all insertion sites, i e  indwelling lines, tubes, and drains  - Monitor endotracheal if appropriate and nasal secretions for changes in amount and color  - [place] appropriate cooling/warming therapies per order  - Administer medications as ordered  - Instruct and encourage patient and family to use good hand hygiene technique  - Identify and instruct in appropriate isolation precautions for identified infection/condition  Outcome: Progressing     Problem: SAFETY ADULT  Goal: Patient will remain free of falls  Description: INTERVENTIONS:  - Educate patient/family on patient safety including physical limitations  - Instruct patient to call for assistance with activity   - Consult OT/PT to assist with strengthening/mobility   - Keep Call bell within reach  - Keep bed low and locked with side rails adjusted as appropriate  - Keep care items and personal belongings within reach  - Initiate and maintain comfort rounds  - Make Fall Risk Sign visible to staff  - Offer Toileting every 1-2 Hours, in advance of need  - Initiate/Maintain bed and chair alarm  - Obtain necessary fall risk management equipment: fall socks and call bell within reach  - Apply yellow socks and bracelet for high fall risk patients  - Consider moving patient to room near nurses station  Outcome: Progressing  Goal: Maintain or return to baseline ADL function  Description: INTERVENTIONS:  -  Assess patient's ability to carry out ADLs; assess patient's baseline for ADL function and identify physical deficits which impact ability to perform ADLs (bathing, care of mouth/teeth, toileting, grooming, dressing, etc )  - Assess/evaluate cause of self-care deficits   - Assess range of motion  - Assess patient's mobility; develop plan if impaired  - Assess patient's need for assistive devices and provide as appropriate  - Encourage maximum independence but intervene and supervise when necessary  - Involve family in performance of ADLs  - Assess for home care needs following discharge   - Consider OT consult to assist with ADL evaluation and planning for discharge  - Provide patient education as appropriate  Outcome: Progressing  Goal: Maintains/Returns to pre admission functional level  Description: INTERVENTIONS:  - Perform BMAT or MOVE assessment daily    - Set and communicate daily mobility goal to care team and patient/family/caregiver  - Collaborate with rehabilitation services on mobility goals if consulted  - Perform Range of Motion 3-4 times a day  - Reposition patient every 1-2 hours    - Dangle patient 3-4 times a day  - Stand patient 3-4 times a day  - Ambulate patient 3-4 times a day  - Out of bed to chair 3-4 times a day   - Out of bed for meals 3-4 times a day  - Out of bed for toileting  - Record patient progress and toleration of activity level   Outcome: Progressing     Problem: DISCHARGE PLANNING  Goal: Discharge to home or other facility with appropriate resources  Description: INTERVENTIONS:  - Identify barriers to discharge w/patient and caregiver  - Arrange for needed discharge resources and transportation as appropriate  - Identify discharge learning needs (meds, wound care, etc )  - Arrange for interpretive services to assist at discharge as needed  - Refer to Case Management Department for coordinating discharge planning if the patient needs post-hospital services based on physician/advanced practitioner order or complex needs related to functional status, cognitive ability, or social support system  Outcome: Progressing     Problem: Knowledge Deficit  Goal: Patient/family/caregiver demonstrates understanding of disease process, treatment plan, medications, and discharge instructions  Description: Complete learning assessment and assess knowledge base    Interventions:  - Provide teaching at level of understanding  - Provide teaching via preferred learning methods  Outcome: Progressing     Problem: METABOLIC, FLUID AND ELECTROLYTES - ADULT  Goal: Electrolytes maintained within normal limits  Description: INTERVENTIONS:  - Monitor labs and assess patient for signs and symptoms of electrolyte imbalances  - Administer electrolyte replacement as ordered  - Monitor response to electrolyte replacements, including repeat lab results as appropriate  - Instruct patient on fluid and nutrition as appropriate  Outcome: Progressing  Goal: Fluid balance maintained  Description: INTERVENTIONS:  - Monitor labs   - Monitor I/O and WT  - Instruct patient on fluid and nutrition as appropriate  - Assess for signs & symptoms of volume excess or deficit  Outcome: Progressing  Goal: Glucose maintained within target range  Description: INTERVENTIONS:  - Monitor Blood Glucose as ordered  - Assess for signs and symptoms of hyperglycemia and hypoglycemia  - Administer ordered medications to maintain glucose within target range  - Assess nutritional intake and initiate nutrition service referral as needed  Outcome: Progressing     Problem: SKIN/TISSUE INTEGRITY - ADULT  Goal: Incision(s), wounds(s) or drain site(s) healing without S/S of infection  Description: INTERVENTIONS  - Assess and document dressing, incision, wound bed, drain sites and surrounding tissue  - Provide patient and family education  - Perform skin care/dressing changes every 24 hrs   Outcome: Progressing

## 2022-06-23 NOTE — ASSESSMENT & PLAN NOTE
· POA with leukocytosis and tachycardia  · Secondary to what appears to be an infected dog bite of her right hand  · Being treated with IV abx  · No blood cultures obtained  · Sepsis appears resolved at this time

## 2022-06-23 NOTE — ED PROVIDER NOTES
History  Chief Complaint   Patient presents with    Dog Bite     Bit by her own dog this morning  Dog UTD on all vaccinations and rabbies  Patient did clean wound generously with water and betadine     28-year-old female patient broke up her 2 dogs fighting at 1030 this morning  Causing laceration to dorsum of hand  She is right-hand dominant that is the hand that was injured  The dog are hers in a vaccinations are up-to-date  Last tetanus unknown  Throughout the day the redness increased, now has difficulty with range of motion of the fingers any passive range of motion causes severe of the 2nd 3rd and 4th fingers  Concern for tenosynovitis  Patient will receive x-ray, Unasyn, Boostrix and will consult Hand surgery          Prior to Admission Medications   Prescriptions Last Dose Informant Patient Reported? Taking? ARIPiprazole (ABILIFY) 30 mg tablet   Yes Yes   Sig: Take 30 mg by mouth daily   desvenlafaxine succinate (PRISTIQ) 50 mg 24 hr tablet   Yes Yes   Sig: Take 50 mg by mouth daily   gabapentin (NEURONTIN) 100 mg capsule   Yes Yes   Sig: Take 100 mg by mouth 2 (two) times a day   hydrOXYzine pamoate (VISTARIL) 50 mg capsule   Yes Yes   Sig: Take 50 mg by mouth 3 (three) times a day as needed for itching   topiramate (TOPAMAX) 100 mg tablet   Yes No   Sig: Take 100 mg by mouth daily   topiramate (TOPAMAX) 50 MG tablet   Yes Yes   Sig: Take 50 mg by mouth daily at bedtime as needed      Facility-Administered Medications: None       History reviewed  No pertinent past medical history  History reviewed  No pertinent surgical history  History reviewed  No pertinent family history  I have reviewed and agree with the history as documented      E-Cigarette/Vaping     E-Cigarette/Vaping Substances     Social History     Tobacco Use    Smoking status: Current Every Day Smoker     Packs/day: 0 50     Types: Cigarettes    Smokeless tobacco: Never Used       Review of Systems   Constitutional: Negative for diaphoresis, fatigue and fever  HENT: Negative for congestion, ear pain, nosebleeds and sore throat  Eyes: Negative for photophobia, pain, discharge and visual disturbance  Respiratory: Negative for cough, choking, chest tightness, shortness of breath and wheezing  Cardiovascular: Negative for chest pain and palpitations  Gastrointestinal: Negative for abdominal distention, abdominal pain, diarrhea and vomiting  Genitourinary: Negative for dysuria, flank pain and frequency  Musculoskeletal: Negative for back pain, gait problem and joint swelling  Hand pain right   Skin: Positive for wound  Negative for color change and rash  Neurological: Negative for dizziness, syncope and headaches  Psychiatric/Behavioral: Negative for behavioral problems and confusion  The patient is not nervous/anxious  All other systems reviewed and are negative  Physical Exam  Physical Exam  Vitals and nursing note reviewed  Constitutional:       General: She is not in acute distress  Appearance: Normal appearance  She is not ill-appearing, toxic-appearing or diaphoretic  HENT:      Head: Normocephalic and atraumatic  Right Ear: Tympanic membrane, ear canal and external ear normal       Left Ear: Tympanic membrane, ear canal and external ear normal       Nose: Nose normal  No congestion or rhinorrhea  Mouth/Throat:      Mouth: Mucous membranes are dry  Pharynx: Oropharynx is clear  No oropharyngeal exudate or posterior oropharyngeal erythema  Eyes:      Extraocular Movements: Extraocular movements intact  Conjunctiva/sclera: Conjunctivae normal       Pupils: Pupils are equal, round, and reactive to light  Cardiovascular:      Rate and Rhythm: Normal rate and regular rhythm  Pulmonary:      Effort: Pulmonary effort is normal  No respiratory distress  Breath sounds: Normal breath sounds     Abdominal:      General: Bowel sounds are normal       Palpations: Abdomen is soft  Tenderness: There is no abdominal tenderness  Musculoskeletal:        Hands:       Cervical back: Normal range of motion and neck supple  No rigidity or tenderness  Right lower leg: No edema  Left lower leg: No edema  Lymphadenopathy:      Cervical: No cervical adenopathy  Skin:     General: Skin is warm and dry  Capillary Refill: Capillary refill takes less than 2 seconds  Findings: No rash  Neurological:      General: No focal deficit present  Mental Status: She is alert and oriented to person, place, and time  Mental status is at baseline     Psychiatric:         Mood and Affect: Mood normal          Behavior: Behavior normal          Vital Signs  ED Triage Vitals   Temperature Pulse Respirations Blood Pressure SpO2   06/22/22 2020 06/22/22 2019 06/22/22 2019 06/22/22 2019 06/22/22 2019   98 °F (36 7 °C) 100 18 137/95 98 %      Temp src Heart Rate Source Patient Position - Orthostatic VS BP Location FiO2 (%)   -- -- 06/22/22 2019 06/22/22 2019 --     Sitting Left arm       Pain Score       06/22/22 2048       8           Vitals:    06/22/22 2019   BP: 137/95   Pulse: 100   Patient Position - Orthostatic VS: Sitting         Visual Acuity      ED Medications  Medications   tetanus-diphtheria-acellular pertussis (BOOSTRIX) IM injection 0 5 mL (has no administration in time range)   ampicillin-sulbactam (UNASYN) 3 g in sodium chloride 0 9 % 100 mL IVPB (has no administration in time range)   sodium chloride 0 9 % bolus 1,000 mL (has no administration in time range)   ketorolac (TORADOL) injection 15 mg (15 mg Intravenous Given 6/22/22 2048)       Diagnostic Studies  Results Reviewed     Procedure Component Value Units Date/Time    CBC and differential [[id number]]     Lab Status: No result Specimen: Blood     Basic metabolic panel [[id number]]     Lab Status: No result Specimen: Blood                  XR hand 3+ views RIGHT    (Results Pending) Procedures  Procedures         ED Course  ED Course as of 06/22/22 2049 Wed Jun 22, 2022 2039 Spoke with Dr Pascale Polanco hand surgery  Explained case in detail and sent picture  Because on extensor surface recommended Unasyn warm water soaks twice daily possibly observation tonight if improved discharge on oral antibiotics tomorrow if not will require incision and washout  Also recommended splint in between soaks                                             MDM  Number of Diagnoses or Management Options  Cellulitis: new and requires workup  Dog bite of hand, right, initial encounter: new and requires workup  Diagnosis management comments: 82-year-old female patient bitten by her dog accidentally 10 30 this morning slowly increasing pain throughout the day and redness now has difficulty moving her fingers due to pain  It is extensor side of the hand that she does have obvious cellulitis  I did speak to hand surgeon on-call advised her I gave Unasyn, Boostrix x-ray  I agreed with treatment plan also recommended patient be observed for IV antibiotics warm water soaks 3 times daily and not improved in 24 hours then an incision and drainage may be considered but she feels [place] because it is the extensor side that conservative therapy may be successful  The recommendations were relayed to the AP on-call for admission at time of the admission process         Amount and/or Complexity of Data Reviewed  Tests in the radiology section of CPT®: ordered and reviewed  Decide to obtain previous medical records or to obtain history from someone other than the patient: yes  Review and summarize past medical records: yes  Discuss the patient with other providers: yes  Independent visualization of images, tracings, or specimens: yes    Risk of Complications, Morbidity, and/or Mortality  Presenting problems: moderate  Diagnostic procedures: moderate  Management options: moderate    Patient Progress  Patient progress: stable      Disposition  Final diagnoses:   Dog bite of hand, right, initial encounter   Cellulitis     Time reflects when diagnosis was documented in both MDM as applicable and the Disposition within this note     Time User Action Codes Description Comment    6/22/2022  8:45 PM [place], [facility] Smoker  0XXA] Dog bite of hand, right, initial encounter     6/22/2022  8:45 PM [place], [address] [facility] Add [L03 [id number]] Cellulitis       ED Disposition     ED Disposition   Admit    Condition   Stable    Date/Time   Wed Jun 22, 2022  8:45 PM    Comment   Case was discussed with Olimpia LAND and the patient's admission status was agreed to be Admission Status: observation status to the service of Dr Mcintyre   Follow-up Information    None         Patient's Medications   Discharge Prescriptions    No medications on file       No discharge procedures on file      PDMP Review     None          ED Provider  Electronically Signed by           Adolfo Menjivar PA-C  06/22/22 2049

## 2022-06-24 ENCOUNTER — APPOINTMENT (INPATIENT)
Dept: MRI IMAGING | Facility: HOSPITAL | Age: 52
DRG: 720 | End: 2022-06-24
Payer: COMMERCIAL

## 2022-06-24 LAB
ANION GAP SERPL CALCULATED.3IONS-SCNC: 11 MMOL/L (ref 4–13)
BASOPHILS # BLD AUTO: 0.01 THOUSANDS/ΜL (ref 0–0.1)
BASOPHILS NFR BLD AUTO: 0 % (ref 0–1)
BUN SERPL-MCNC: 14 MG/DL (ref 5–25)
CALCIUM SERPL-MCNC: 9.4 MG/DL (ref 8.3–10.1)
CHLORIDE SERPL-SCNC: 104 MMOL/L (ref 100–108)
CO2 SERPL-SCNC: 22 MMOL/L (ref 21–32)
CREAT SERPL-MCNC: 1.09 MG/DL (ref 0.6–1.3)
EOSINOPHIL # BLD AUTO: 0 THOUSAND/ΜL (ref 0–0.61)
EOSINOPHIL NFR BLD AUTO: 0 % (ref 0–6)
ERYTHROCYTE [DISTWIDTH] IN BLOOD BY AUTOMATED COUNT: 12.4 % (ref 11.6–15.1)
GFR SERPL CREATININE-BSD FRML MDRD: 59 ML/MIN/1.73SQ M
GLUCOSE SERPL-MCNC: 114 MG/DL (ref 65–140)
HCT VFR BLD AUTO: 46.4 % (ref 34.8–46.1)
HGB BLD-MCNC: 15.6 G/DL (ref 11.5–15.4)
IMM GRANULOCYTES # BLD AUTO: 0.01 THOUSAND/UL (ref 0–0.2)
IMM GRANULOCYTES NFR BLD AUTO: 0 % (ref 0–2)
LYMPHOCYTES # BLD AUTO: 1.38 THOUSANDS/ΜL (ref 0.6–4.47)
LYMPHOCYTES NFR BLD AUTO: 21 % (ref 14–44)
MCH RBC QN AUTO: 30.5 PG (ref 26.8–34.3)
MCHC RBC AUTO-ENTMCNC: 33.6 G/DL (ref 31.4–37.4)
MCV RBC AUTO: 91 FL (ref 82–98)
MONOCYTES # BLD AUTO: 0.47 THOUSAND/ΜL (ref 0.17–1.22)
MONOCYTES NFR BLD AUTO: 7 % (ref 4–12)
NEUTROPHILS # BLD AUTO: 4.63 THOUSANDS/ΜL (ref 1.85–7.62)
NEUTS SEG NFR BLD AUTO: 72 % (ref 43–75)
NRBC BLD AUTO-RTO: 0 /100 WBCS
PLATELET # BLD AUTO: 166 THOUSANDS/UL (ref 149–390)
PMV BLD AUTO: 9 FL (ref 8.9–12.7)
POTASSIUM SERPL-SCNC: 3.9 MMOL/L (ref 3.5–5.3)
RBC # BLD AUTO: 5.11 MILLION/UL (ref 3.81–5.12)
SODIUM SERPL-SCNC: 137 MMOL/L (ref 136–145)
WBC # BLD AUTO: 6.5 THOUSAND/UL (ref 4.31–10.16)

## 2022-06-24 PROCEDURE — 85025 COMPLETE CBC W/AUTO DIFF WBC: CPT | Performed by: PHYSICIAN ASSISTANT

## 2022-06-24 PROCEDURE — 73218 MRI UPPER EXTREMITY W/O DYE: CPT

## 2022-06-24 PROCEDURE — 99232 SBSQ HOSP IP/OBS MODERATE 35: CPT | Performed by: PHYSICIAN ASSISTANT

## 2022-06-24 PROCEDURE — G1004 CDSM NDSC: HCPCS

## 2022-06-24 PROCEDURE — 80048 BASIC METABOLIC PNL TOTAL CA: CPT | Performed by: PHYSICIAN ASSISTANT

## 2022-06-24 PROCEDURE — 87081 CULTURE SCREEN ONLY: CPT | Performed by: PHYSICIAN ASSISTANT

## 2022-06-24 RX ORDER — VANCOMYCIN HYDROCHLORIDE 1 G/200ML
15 INJECTION, SOLUTION INTRAVENOUS EVERY 12 HOURS
Status: DISCONTINUED | OUTPATIENT
Start: 2022-06-24 | End: 2022-06-24 | Stop reason: DRUGHIGH

## 2022-06-24 RX ORDER — LORAZEPAM 1 MG/1
1 TABLET ORAL
Status: DISCONTINUED | OUTPATIENT
Start: 2022-06-24 | End: 2022-06-27 | Stop reason: HOSPADM

## 2022-06-24 RX ADMIN — OXYCODONE HYDROCHLORIDE 5 MG: 5 TABLET ORAL at 12:38

## 2022-06-24 RX ADMIN — VANCOMYCIN HYDROCHLORIDE 750 MG: 5 INJECTION, POWDER, LYOPHILIZED, FOR SOLUTION INTRAVENOUS at 19:29

## 2022-06-24 RX ADMIN — GABAPENTIN 100 MG: 100 CAPSULE ORAL at 17:09

## 2022-06-24 RX ADMIN — ARIPIPRAZOLE 30 MG: 10 TABLET ORAL at 08:27

## 2022-06-24 RX ADMIN — TOPIRAMATE 150 MG: 100 TABLET, FILM COATED ORAL at 08:27

## 2022-06-24 RX ADMIN — AMPICILLIN SODIUM AND SULBACTAM SODIUM 3 G: 2; 1 INJECTION, POWDER, FOR SOLUTION INTRAMUSCULAR; INTRAVENOUS at 05:12

## 2022-06-24 RX ADMIN — AMPICILLIN SODIUM AND SULBACTAM SODIUM 3 G: 2; 1 INJECTION, POWDER, FOR SOLUTION INTRAMUSCULAR; INTRAVENOUS at 00:12

## 2022-06-24 RX ADMIN — DESVENLAFAXINE 50 MG: 50 TABLET, FILM COATED, EXTENDED RELEASE ORAL at 08:26

## 2022-06-24 RX ADMIN — NICOTINE 1 PATCH: 21 PATCH, EXTENDED RELEASE TRANSDERMAL at 08:26

## 2022-06-24 RX ADMIN — AMPICILLIN SODIUM AND SULBACTAM SODIUM 3 G: 2; 1 INJECTION, POWDER, FOR SOLUTION INTRAMUSCULAR; INTRAVENOUS at 12:38

## 2022-06-24 RX ADMIN — LORAZEPAM 1 MG: 1 TABLET ORAL at 17:09

## 2022-06-24 RX ADMIN — HEPARIN SODIUM 5000 UNITS: 5000 INJECTION INTRAVENOUS; SUBCUTANEOUS at 05:12

## 2022-06-24 RX ADMIN — HEPARIN SODIUM 5000 UNITS: 5000 INJECTION INTRAVENOUS; SUBCUTANEOUS at 22:15

## 2022-06-24 RX ADMIN — OXYCODONE HYDROCHLORIDE 10 MG: 10 TABLET ORAL at 22:15

## 2022-06-24 RX ADMIN — AMPICILLIN SODIUM AND SULBACTAM SODIUM 3 G: 2; 1 INJECTION, POWDER, FOR SOLUTION INTRAMUSCULAR; INTRAVENOUS at 17:09

## 2022-06-24 RX ADMIN — HEPARIN SODIUM 5000 UNITS: 5000 INJECTION INTRAVENOUS; SUBCUTANEOUS at 14:19

## 2022-06-24 RX ADMIN — GABAPENTIN 100 MG: 100 CAPSULE ORAL at 08:27

## 2022-06-24 NOTE — PLAN OF CARE
Problem: PAIN - ADULT  Goal: Verbalizes/displays adequate comfort level or baseline comfort level  Description: Interventions:  - Encourage patient to monitor pain and request assistance  - Assess pain using appropriate pain scale  - Administer analgesics based on type and severity of pain and evaluate response  - Implement non-pharmacological measures as appropriate and evaluate response  - Consider cultural and social influences on pain and pain management  - Notify physician/advanced practitioner if interventions unsuccessful or patient reports new pain  Outcome: Progressing     Problem: INFECTION - ADULT  Goal: Absence or prevention of progression during hospitalization  Description: INTERVENTIONS:  - Assess and monitor for signs and symptoms of infection  - Monitor lab/diagnostic results  - Monitor all insertion sites, i e  indwelling lines, tubes, and drains  - Monitor endotracheal if appropriate and nasal secretions for changes in amount and color  - [place] appropriate cooling/warming therapies per order  - Administer medications as ordered  - Instruct and encourage patient and family to use good hand hygiene technique  - Identify and instruct in appropriate isolation precautions for identified infection/condition  Outcome: Progressing     Problem: SAFETY ADULT  Goal: Patient will remain free of falls  Description: INTERVENTIONS:  - Educate patient/family on patient safety including physical limitations  - Instruct patient to call for assistance with activity   - Consult OT/PT to assist with strengthening/mobility   - Keep Call bell within reach  - Keep bed low and locked with side rails adjusted as appropriate  - Keep care items and personal belongings within reach  - Initiate and maintain comfort rounds  - Make Fall Risk Sign visible to staff  - Offer Toileting every 1-2 Hours, in advance of need  - Initiate/Maintain bed and chair alarm  - Obtain necessary fall risk management equipment: fall socks and call bell within reach  - Apply yellow socks and bracelet for high fall risk patients  - Consider moving patient to room near nurses station  Outcome: Progressing  Goal: Maintain or return to baseline ADL function  Description: INTERVENTIONS:  -  Assess patient's ability to carry out ADLs; assess patient's baseline for ADL function and identify physical deficits which impact ability to perform ADLs (bathing, care of mouth/teeth, toileting, grooming, dressing, etc )  - Assess/evaluate cause of self-care deficits   - Assess range of motion  - Assess patient's mobility; develop plan if impaired  - Assess patient's need for assistive devices and provide as appropriate  - Encourage maximum independence but intervene and supervise when necessary  - Involve family in performance of ADLs  - Assess for home care needs following discharge   - Consider OT consult to assist with ADL evaluation and planning for discharge  - Provide patient education as appropriate  Outcome: Progressing  Goal: Maintains/Returns to pre admission functional level  Description: INTERVENTIONS:  - Perform BMAT or MOVE assessment daily    - Set and communicate daily mobility goal to care team and patient/family/caregiver  - Collaborate with rehabilitation services on mobility goals if consulted  - Perform Range of Motion 3-4 times a day  - Reposition patient every 1-2 hours    - Dangle patient 3-4 times a day  - Stand patient 3-4 times a day  - Ambulate patient 3-4 times a day  - Out of bed to chair 3-4 times a day   - Out of bed for meals 3-4 times a day  - Out of bed for toileting  - Record patient progress and toleration of activity level   Outcome: Progressing     Problem: DISCHARGE PLANNING  Goal: Discharge to home or other facility with appropriate resources  Description: INTERVENTIONS:  - Identify barriers to discharge w/patient and caregiver  - Arrange for needed discharge resources and transportation as appropriate  - Identify discharge learning needs (meds, wound care, etc )  - Arrange for interpretive services to assist at discharge as needed  - Refer to Case Management Department for coordinating discharge planning if the patient needs post-hospital services based on physician/advanced practitioner order or complex needs related to functional status, cognitive ability, or social support system  Outcome: Progressing     Problem: Knowledge Deficit  Goal: Patient/family/caregiver demonstrates understanding of disease process, treatment plan, medications, and discharge instructions  Description: Complete learning assessment and assess knowledge base    Interventions:  - Provide teaching at level of understanding  - Provide teaching via preferred learning methods  Outcome: Progressing     Problem: METABOLIC, FLUID AND ELECTROLYTES - ADULT  Goal: Electrolytes maintained within normal limits  Description: INTERVENTIONS:  - Monitor labs and assess patient for signs and symptoms of electrolyte imbalances  - Administer electrolyte replacement as ordered  - Monitor response to electrolyte replacements, including repeat lab results as appropriate  - Instruct patient on fluid and nutrition as appropriate  Outcome: Progressing  Goal: Fluid balance maintained  Description: INTERVENTIONS:  - Monitor labs   - Monitor I/O and WT  - Instruct patient on fluid and nutrition as appropriate  - Assess for signs & symptoms of volume excess or deficit  Outcome: Progressing  Goal: Glucose maintained within target range  Description: INTERVENTIONS:  - Monitor Blood Glucose as ordered  - Assess for signs and symptoms of hyperglycemia and hypoglycemia  - Administer ordered medications to maintain glucose within target range  - Assess nutritional intake and initiate nutrition service referral as needed  Outcome: Progressing     Problem: SKIN/TISSUE INTEGRITY - ADULT  Goal: Incision(s), wounds(s) or drain site(s) healing without S/S of infection  Description: INTERVENTIONS  - Assess and document dressing, incision, wound bed, drain sites and surrounding tissue  - Provide patient and family education  - Perform skin care/dressing changes every 24 hrs   Outcome: Progressing

## 2022-06-24 NOTE — ASSESSMENT & PLAN NOTE
· Patient presented after being bit by her Dog earlier today around 10am   She reports that following the bite she developed significant erythema of the right hand as well as swelling in the dorsal portion which prevents her from being able to extend her fingers  She also reports significant pain  · She states that her dogs were up-to-date on her their shots including rabies  · No fractures noted on x-ray of the right hand performed  · White blood cells elevated at 11 63 - now wnl  · Patient administered Boostrix in ED  · ED provider discussed with Hand surgery who stated that most likely the patient's inability to move her fingers is due to the swelling and not a specific extensor injury  Recommended admission here IV abx  · Continue with Unasyn 3 g IV q 6  · Warm water soaks of right hand t i d  · Appears worsened on examination today, further limited ROM of fingers, erythema and swelling now extended to elbow  · MRI hand and forearm ordered and pending, discussed with hand surgery who agree   Will continue to remain in contact with them

## 2022-06-24 NOTE — ASSESSMENT & PLAN NOTE
· POA with leukocytosis and tachycardia  · Secondary to what appears to be an infected dog bite of her right hand  · Being treated with IV abx  · No blood cultures obtained before abx initiated unfortunately  · Sepsis appears resolved at this time

## 2022-06-24 NOTE — PLAN OF CARE
Problem: PAIN - ADULT  Goal: Verbalizes/displays adequate comfort level or baseline comfort level  Description: Interventions:  - Encourage patient to monitor pain and request assistance  - Assess pain using appropriate pain scale  - Administer analgesics based on type and severity of pain and evaluate response  - Implement non-pharmacological measures as appropriate and evaluate response  - Consider cultural and social influences on pain and pain management  - Notify physician/advanced practitioner if interventions unsuccessful or patient reports new pain  Outcome: Progressing     Problem: INFECTION - ADULT  Goal: Absence or prevention of progression during hospitalization  Description: INTERVENTIONS:  - Assess and monitor for signs and symptoms of infection  - Monitor lab/diagnostic results  - Monitor all insertion sites, i e  indwelling lines, tubes, and drains  - Monitor endotracheal if appropriate and nasal secretions for changes in amount and color  - [place] appropriate cooling/warming therapies per order  - Administer medications as ordered  - Instruct and encourage patient and family to use good hand hygiene technique  - Identify and instruct in appropriate isolation precautions for identified infection/condition  Outcome: Progressing     Problem: SAFETY ADULT  Goal: Patient will remain free of falls  Description: INTERVENTIONS:  - Educate patient/family on patient safety including physical limitations  - Instruct patient to call for assistance with activity   - Consult OT/PT to assist with strengthening/mobility   - Keep Call bell within reach  - Keep bed low and locked with side rails adjusted as appropriate  - Keep care items and personal belongings within reach  - Initiate and maintain comfort rounds  - Make Fall Risk Sign visible to staff  - Offer Toileting every 1-2 Hours, in advance of need  - Initiate/Maintain bed and chair alarm  - Obtain necessary fall risk management equipment: fall socks and call bell within reach  - Apply yellow socks and bracelet for high fall risk patients  - Consider moving patient to room near nurses station  Outcome: Progressing  Goal: Maintain or return to baseline ADL function  Description: INTERVENTIONS:  -  Assess patient's ability to carry out ADLs; assess patient's baseline for ADL function and identify physical deficits which impact ability to perform ADLs (bathing, care of mouth/teeth, toileting, grooming, dressing, etc )  - Assess/evaluate cause of self-care deficits   - Assess range of motion  - Assess patient's mobility; develop plan if impaired  - Assess patient's need for assistive devices and provide as appropriate  - Encourage maximum independence but intervene and supervise when necessary  - Involve family in performance of ADLs  - Assess for home care needs following discharge   - Consider OT consult to assist with ADL evaluation and planning for discharge  - Provide patient education as appropriate  Outcome: Progressing  Goal: Maintains/Returns to pre admission functional level  Description: INTERVENTIONS:  - Perform BMAT or MOVE assessment daily    - Set and communicate daily mobility goal to care team and patient/family/caregiver  - Collaborate with rehabilitation services on mobility goals if consulted  - Perform Range of Motion 3-4 times a day  - Reposition patient every 1-2 hours    - Dangle patient 3-4 times a day  - Stand patient 3-4 times a day  - Ambulate patient 3-4 times a day  - Out of bed to chair 3-4 times a day   - Out of bed for meals 3-4 times a day  - Out of bed for toileting  - Record patient progress and toleration of activity level   Outcome: Progressing     Problem: DISCHARGE PLANNING  Goal: Discharge to home or other facility with appropriate resources  Description: INTERVENTIONS:  - Identify barriers to discharge w/patient and caregiver  - Arrange for needed discharge resources and transportation as appropriate  - Identify discharge learning needs (meds, wound care, etc )  - Arrange for interpretive services to assist at discharge as needed  - Refer to Case Management Department for coordinating discharge planning if the patient needs post-hospital services based on physician/advanced practitioner order or complex needs related to functional status, cognitive ability, or social support system  Outcome: Progressing     Problem: Knowledge Deficit  Goal: Patient/family/caregiver demonstrates understanding of disease process, treatment plan, medications, and discharge instructions  Description: Complete learning assessment and assess knowledge base    Interventions:  - Provide teaching at level of understanding  - Provide teaching via preferred learning methods  Outcome: Progressing     Problem: METABOLIC, FLUID AND ELECTROLYTES - ADULT  Goal: Electrolytes maintained within normal limits  Description: INTERVENTIONS:  - Monitor labs and assess patient for signs and symptoms of electrolyte imbalances  - Administer electrolyte replacement as ordered  - Monitor response to electrolyte replacements, including repeat lab results as appropriate  - Instruct patient on fluid and nutrition as appropriate  Outcome: Progressing  Goal: Fluid balance maintained  Description: INTERVENTIONS:  - Monitor labs   - Monitor I/O and WT  - Instruct patient on fluid and nutrition as appropriate  - Assess for signs & symptoms of volume excess or deficit  Outcome: Progressing  Goal: Glucose maintained within target range  Description: INTERVENTIONS:  - Monitor Blood Glucose as ordered  - Assess for signs and symptoms of hyperglycemia and hypoglycemia  - Administer ordered medications to maintain glucose within target range  - Assess nutritional intake and initiate nutrition service referral as needed  Outcome: Progressing     Problem: SKIN/TISSUE INTEGRITY - ADULT  Goal: Incision(s), wounds(s) or drain site(s) healing without S/S of infection  Description: INTERVENTIONS  - Assess and document dressing, incision, wound bed, drain sites and surrounding tissue  - Provide patient and family education  - Perform skin care/dressing changes every 24 hrs   Outcome: Progressing

## 2022-06-24 NOTE — PROGRESS NOTES
Vancomycin Assessment    Juan Carlos Alejo is a 54 y o  female who is currently receiving vancomycin 15 mg/kg iv q12h for skin & soft tissue infection       Relevant clinical data and objective history reviewed:  Creatinine   Date Value Ref Range Status   06/24/2022 1 09 0 60 - 1 30 mg/dL Final     Comment:     Standardized to IDMS reference method   06/23/2022 1 11 0 60 - 1 30 mg/dL Final     Comment:     Standardized to IDMS reference method   06/22/2022 1 38 (H) 0 60 - 1 30 mg/dL Final     Comment:     Standardized to IDMS reference method     /72 (BP Location: Left arm)   Pulse (!) 115   Temp 98 °F (36 7 °C) (Temporal)   Resp 18   Ht 5' 3" (1 6 m)   Wt 68 kg (149 lb 14 6 oz)   SpO2 98%   BMI 26 56 kg/m²   I/O last 3 completed shifts: In: 61 [P O :60]  Out: -   Lab Results   Component Value Date/Time    BUN 14 06/24/2022 10:01 AM    WBC 6 50 06/24/2022 10:01 AM    HGB 15 6 (H) 06/24/2022 10:01 AM    HCT 46 4 (H) 06/24/2022 10:01 AM    MCV 91 06/24/2022 10:01 AM     06/24/2022 10:01 AM     Temp Readings from Last 3 Encounters:   06/24/22 98 °F (36 7 °C) (Temporal)     Vancomycin Days of Therapy: 1    Assessment/Plan  The patient is currently on vancomycin utilizing scheduled dosing based on adjusted body weight (due to obesity)  Baseline risks associated with therapy include: pre-existing renal impairment  The patient is currently receiving 15 mg/kg iv q12h and after clinical evaluation will be changed to 12 5mg/kg iv q12h  Pharmacy will also follow closely for s/sx of nephrotoxicity, infusion reactions, and appropriateness of therapy  BMP and CBC will be ordered per protocol  Plan for trough as patient approaches steady state, prior to the 4th  dose at approximately 0530 on 6/26/22  Due to infection severity, will target a trough of 15-20   Pharmacy will continue to follow the patients culture results and clinical progress daily      Raine Bradford, Pharmacist

## 2022-06-24 NOTE — CASE MANAGEMENT
Case Management Assessment    Patient name Jacob Martínez  Location [facility] [id number] MRN [id number]  : 1970 Date 2022       Current Admission Date: 2022  Current Admission Diagnosis:Dog bite of right hand with infection   Patient Active Problem List    Diagnosis Date Noted    Sepsis ([facility] ) 2022    Dog bite of right hand with infection 2022    Bipolar disorder ([facility] ) 2022      LOS (days): 0  Geometric Mean LOS (GMLOS) (days):   Days to GMLOS:     OBJECTIVE:              Current admission status: Observation       Preferred Pharmacy:   [facility]/pharmacy #[id number]- [place] [address] [id number]  Phone: [phone] Fax: [phone]    Primary Care Provider: No primary care provider on file  Primary Insurance: [address] [place] [address]  Secondary Insurance:     ASSESSMENT:  Active Health Care Proxies    There are no active Health Care Proxies on file  Advance Directives  Does patient have a [address]?: No  Was patient offered paperwork?: Yes (provided)  Does patient currently have a Health Care decision maker?: Yes, please see Health Care Proxy section  Does patient have Advance Directives?: No  Was patient offered paperwork?: Yes (provided)  Primary Contact: Kathrine Mckeon              Patient Information  Admitted from[de-identified] Home  Mental Status: Alert  During Assessment patient was accompanied by: Not accompanied during assessment  Assessment information provided by[de-identified] Patient  Primary Caregiver: Self  Support Systems: Self, Spouse/significant other, [address] of Residence: [address] [place] [address] Avenue do you live in?: [place]  Home entry access options   Select all that apply : No steps to enter home  Type of Current Residence: [place]  In the last 12 months, was there a time when you were not able to pay the mortgage or rent on time?: No  In the last 12 months, how many places have you lived?: 1  In the last 12 months, was there a time when you did not have a steady place to sleep or slept in a shelter (including now)?: No  Homeless/housing insecurity resource given?: N/A  Living Arrangements: Lives w/ Spouse/significant other  Is patient a ?: No    Activities of Daily Living Prior to Admission  Functional Status: Independent  Completes ADLs independently?: Yes  Ambulates independently?: Yes  Does patient use assisted devices?: No  Does patient currently own DME?: No  Does patient have a history of Outpatient Therapy (PT/OT)?: No  Does the patient have a history of Short-Term Rehab?: No  Does patient have a history of HHC?: No  Does patient currently have [facility]?: No         Patient Information Continued  Income Source: SSI/SSD  Does patient have prescription coverage?: Yes  Within the past 12 months, you worried that your food would run out before you got the money to buy more : Never true  Within the past 12 months, the food you bought just didn't last and you didn't have money to get more : Never true  Food insecurity resource given?: N/A  Does patient receive dialysis treatments?: No  Does patient have a history of substance abuse?: Yes  Historical substance use preference: "Crack" cocaine  History of Withdrawal Symptoms: Denies past symptoms  Is patient currently in treatment for substance abuse?: N/A - sober  Does patient have a history of Mental Health Diagnosis?: Yes  Is patient receiving treatment for mental health?: Yes (bipolar, depression   follows with psych [id number] )  Has patient received inpatient treatment related to mental health in the last 2 years?: No ([facility]         Means of Transportation  Means of Transport to Appts[de-identified] Friends  In the past 12 months, has lack of transportation kept you from medical appointments or from getting medications?: No  In the past 12 months, has lack of transportation kept you from meetings, work, or from getting things needed for daily living?: No  Was application for public transport provided?: N/A  Cm met with the patient to evaluate the patients prior function and living situation and any barriers to d/c and form a safe d/c plan  Cm also evaluated the patient for any services in the home or needs for services  CM also discussed with patient that their preferences will be taken into account/consideration  Pt admitted for dog bite to r hand  No discharge needs noted at current time, CM to follow

## 2022-06-25 LAB
ANION GAP SERPL CALCULATED.3IONS-SCNC: 9 MMOL/L (ref 4–13)
BASOPHILS # BLD AUTO: 0.03 THOUSANDS/ΜL (ref 0–0.1)
BASOPHILS NFR BLD AUTO: 1 % (ref 0–1)
BUN SERPL-MCNC: 14 MG/DL (ref 5–25)
CALCIUM SERPL-MCNC: 8.9 MG/DL (ref 8.3–10.1)
CHLORIDE SERPL-SCNC: 107 MMOL/L (ref 100–108)
CO2 SERPL-SCNC: 21 MMOL/L (ref 21–32)
CREAT SERPL-MCNC: 0.88 MG/DL (ref 0.6–1.3)
EOSINOPHIL # BLD AUTO: 0 THOUSAND/ΜL (ref 0–0.61)
EOSINOPHIL NFR BLD AUTO: 0 % (ref 0–6)
ERYTHROCYTE [DISTWIDTH] IN BLOOD BY AUTOMATED COUNT: 12.6 % (ref 11.6–15.1)
GFR SERPL CREATININE-BSD FRML MDRD: 77 ML/MIN/1.73SQ M
GLUCOSE SERPL-MCNC: 97 MG/DL (ref 65–140)
HCT VFR BLD AUTO: 42 % (ref 34.8–46.1)
HGB BLD-MCNC: 14.2 G/DL (ref 11.5–15.4)
IMM GRANULOCYTES # BLD AUTO: 0.01 THOUSAND/UL (ref 0–0.2)
IMM GRANULOCYTES NFR BLD AUTO: 0 % (ref 0–2)
LYMPHOCYTES # BLD AUTO: 2.2 THOUSANDS/ΜL (ref 0.6–4.47)
LYMPHOCYTES NFR BLD AUTO: 37 % (ref 14–44)
MAGNESIUM SERPL-MCNC: 2.4 MG/DL (ref 1.6–2.6)
MCH RBC QN AUTO: 30.6 PG (ref 26.8–34.3)
MCHC RBC AUTO-ENTMCNC: 33.8 G/DL (ref 31.4–37.4)
MCV RBC AUTO: 91 FL (ref 82–98)
MONOCYTES # BLD AUTO: 0.5 THOUSAND/ΜL (ref 0.17–1.22)
MONOCYTES NFR BLD AUTO: 8 % (ref 4–12)
MRSA NOSE QL CULT: NORMAL
NEUTROPHILS # BLD AUTO: 3.29 THOUSANDS/ΜL (ref 1.85–7.62)
NEUTS SEG NFR BLD AUTO: 54 % (ref 43–75)
NRBC BLD AUTO-RTO: 0 /100 WBCS
PHOSPHATE SERPL-MCNC: 3.3 MG/DL (ref 2.7–4.5)
PLATELET # BLD AUTO: 183 THOUSANDS/UL (ref 149–390)
PMV BLD AUTO: 9.2 FL (ref 8.9–12.7)
POTASSIUM SERPL-SCNC: 3.9 MMOL/L (ref 3.5–5.3)
RBC # BLD AUTO: 4.64 MILLION/UL (ref 3.81–5.12)
SODIUM SERPL-SCNC: 137 MMOL/L (ref 136–145)
WBC # BLD AUTO: 6.03 THOUSAND/UL (ref 4.31–10.16)

## 2022-06-25 PROCEDURE — 83735 ASSAY OF MAGNESIUM: CPT | Performed by: FAMILY MEDICINE

## 2022-06-25 PROCEDURE — 80048 BASIC METABOLIC PNL TOTAL CA: CPT | Performed by: FAMILY MEDICINE

## 2022-06-25 PROCEDURE — 99254 IP/OBS CNSLTJ NEW/EST MOD 60: CPT | Performed by: ORTHOPAEDIC SURGERY

## 2022-06-25 PROCEDURE — 85025 COMPLETE CBC W/AUTO DIFF WBC: CPT | Performed by: FAMILY MEDICINE

## 2022-06-25 PROCEDURE — 84100 ASSAY OF PHOSPHORUS: CPT | Performed by: FAMILY MEDICINE

## 2022-06-25 PROCEDURE — 99233 SBSQ HOSP IP/OBS HIGH 50: CPT | Performed by: INTERNAL MEDICINE

## 2022-06-25 RX ORDER — POTASSIUM CHLORIDE 750 MG/1
10 TABLET, EXTENDED RELEASE ORAL ONCE
Status: COMPLETED | OUTPATIENT
Start: 2022-06-25 | End: 2022-06-25

## 2022-06-25 RX ADMIN — NICOTINE 1 PATCH: 21 PATCH, EXTENDED RELEASE TRANSDERMAL at 09:10

## 2022-06-25 RX ADMIN — GABAPENTIN 100 MG: 100 CAPSULE ORAL at 17:28

## 2022-06-25 RX ADMIN — AMPICILLIN SODIUM AND SULBACTAM SODIUM 3 G: 2; 1 INJECTION, POWDER, FOR SOLUTION INTRAMUSCULAR; INTRAVENOUS at 17:28

## 2022-06-25 RX ADMIN — AMPICILLIN SODIUM AND SULBACTAM SODIUM 3 G: 2; 1 INJECTION, POWDER, FOR SOLUTION INTRAMUSCULAR; INTRAVENOUS at 12:50

## 2022-06-25 RX ADMIN — HEPARIN SODIUM 5000 UNITS: 5000 INJECTION INTRAVENOUS; SUBCUTANEOUS at 05:12

## 2022-06-25 RX ADMIN — HEPARIN SODIUM 5000 UNITS: 5000 INJECTION INTRAVENOUS; SUBCUTANEOUS at 21:37

## 2022-06-25 RX ADMIN — HEPARIN SODIUM 5000 UNITS: 5000 INJECTION INTRAVENOUS; SUBCUTANEOUS at 13:48

## 2022-06-25 RX ADMIN — VANCOMYCIN HYDROCHLORIDE 750 MG: 5 INJECTION, POWDER, LYOPHILIZED, FOR SOLUTION INTRAVENOUS at 18:41

## 2022-06-25 RX ADMIN — AMPICILLIN SODIUM AND SULBACTAM SODIUM 3 G: 2; 1 INJECTION, POWDER, FOR SOLUTION INTRAMUSCULAR; INTRAVENOUS at 00:37

## 2022-06-25 RX ADMIN — VANCOMYCIN HYDROCHLORIDE 750 MG: 5 INJECTION, POWDER, LYOPHILIZED, FOR SOLUTION INTRAVENOUS at 05:12

## 2022-06-25 RX ADMIN — DESVENLAFAXINE 50 MG: 50 TABLET, FILM COATED, EXTENDED RELEASE ORAL at 09:04

## 2022-06-25 RX ADMIN — OXYCODONE HYDROCHLORIDE 10 MG: 10 TABLET ORAL at 12:50

## 2022-06-25 RX ADMIN — GABAPENTIN 100 MG: 100 CAPSULE ORAL at 09:03

## 2022-06-25 RX ADMIN — POTASSIUM CHLORIDE 10 MEQ: 750 TABLET, EXTENDED RELEASE ORAL at 09:03

## 2022-06-25 RX ADMIN — ARIPIPRAZOLE 30 MG: 10 TABLET ORAL at 09:03

## 2022-06-25 RX ADMIN — TOPIRAMATE 150 MG: 100 TABLET, FILM COATED ORAL at 09:03

## 2022-06-25 NOTE — PLAN OF CARE
Problem: PAIN - ADULT  Goal: Verbalizes/displays adequate comfort level or baseline comfort level  Description: Interventions:  - Encourage patient to monitor pain and request assistance  - Assess pain using appropriate pain scale  - Administer analgesics based on type and severity of pain and evaluate response  - Implement non-pharmacological measures as appropriate and evaluate response  - Consider cultural and social influences on pain and pain management  - Notify physician/advanced practitioner if interventions unsuccessful or patient reports new pain  Outcome: Progressing     Problem: INFECTION - ADULT  Goal: Absence or prevention of progression during hospitalization  Description: INTERVENTIONS:  - Assess and monitor for signs and symptoms of infection  - Monitor lab/diagnostic results  - Monitor all insertion sites, i e  indwelling lines, tubes, and drains  - Monitor endotracheal if appropriate and nasal secretions for changes in amount and color  - [place] appropriate cooling/warming therapies per order  - Administer medications as ordered  - Instruct and encourage patient and family to use good hand hygiene technique  - Identify and instruct in appropriate isolation precautions for identified infection/condition  Outcome: Progressing     Problem: SAFETY ADULT  Goal: Patient will remain free of falls  Description: INTERVENTIONS:  - Educate patient/family on patient safety including physical limitations  - Instruct patient to call for assistance with activity   - Consult OT/PT to assist with strengthening/mobility   - Keep Call bell within reach  - Keep bed low and locked with side rails adjusted as appropriate  - Keep care items and personal belongings within reach  - Initiate and maintain comfort rounds  - Make Fall Risk Sign visible to staff  - Offer Toileting every 1-2 Hours, in advance of need  - Initiate/Maintain bed and chair alarm  - Obtain necessary fall risk management equipment: fall socks and call bell within reach  - Apply yellow socks and bracelet for high fall risk patients  - Consider moving patient to room near nurses station  Outcome: Progressing  Goal: Maintain or return to baseline ADL function  Description: INTERVENTIONS:  -  Assess patient's ability to carry out ADLs; assess patient's baseline for ADL function and identify physical deficits which impact ability to perform ADLs (bathing, care of mouth/teeth, toileting, grooming, dressing, etc )  - Assess/evaluate cause of self-care deficits   - Assess range of motion  - Assess patient's mobility; develop plan if impaired  - Assess patient's need for assistive devices and provide as appropriate  - Encourage maximum independence but intervene and supervise when necessary  - Involve family in performance of ADLs  - Assess for home care needs following discharge   - Consider OT consult to assist with ADL evaluation and planning for discharge  - Provide patient education as appropriate  Outcome: Progressing  Goal: Maintains/Returns to pre admission functional level  Description: INTERVENTIONS:  - Perform BMAT or MOVE assessment daily    - Set and communicate daily mobility goal to care team and patient/family/caregiver  - Collaborate with rehabilitation services on mobility goals if consulted  - Perform Range of Motion 3-4 times a day  - Reposition patient every 1-2 hours    - Dangle patient 3-4 times a day  - Stand patient 3-4 times a day  - Ambulate patient 3-4 times a day  - Out of bed to chair 3-4 times a day   - Out of bed for meals 3-4 times a day  - Out of bed for toileting  - Record patient progress and toleration of activity level   Outcome: Progressing     Problem: DISCHARGE PLANNING  Goal: Discharge to home or other facility with appropriate resources  Description: INTERVENTIONS:  - Identify barriers to discharge w/patient and caregiver  - Arrange for needed discharge resources and transportation as appropriate  - Identify discharge learning needs (meds, wound care, etc )  - Arrange for interpretive services to assist at discharge as needed  - Refer to Case Management Department for coordinating discharge planning if the patient needs post-hospital services based on physician/advanced practitioner order or complex needs related to functional status, cognitive ability, or social support system  Outcome: Progressing     Problem: Knowledge Deficit  Goal: Patient/family/caregiver demonstrates understanding of disease process, treatment plan, medications, and discharge instructions  Description: Complete learning assessment and assess knowledge base    Interventions:  - Provide teaching at level of understanding  - Provide teaching via preferred learning methods  Outcome: Progressing     Problem: METABOLIC, FLUID AND ELECTROLYTES - ADULT  Goal: Electrolytes maintained within normal limits  Description: INTERVENTIONS:  - Monitor labs and assess patient for signs and symptoms of electrolyte imbalances  - Administer electrolyte replacement as ordered  - Monitor response to electrolyte replacements, including repeat lab results as appropriate  - Instruct patient on fluid and nutrition as appropriate  Outcome: Progressing  Goal: Fluid balance maintained  Description: INTERVENTIONS:  - Monitor labs   - Monitor I/O and WT  - Instruct patient on fluid and nutrition as appropriate  - Assess for signs & symptoms of volume excess or deficit  Outcome: Progressing  Goal: Glucose maintained within target range  Description: INTERVENTIONS:  - Monitor Blood Glucose as ordered  - Assess for signs and symptoms of hyperglycemia and hypoglycemia  - Administer ordered medications to maintain glucose within target range  - Assess nutritional intake and initiate nutrition service referral as needed  Outcome: Progressing     Problem: SKIN/TISSUE INTEGRITY - ADULT  Goal: Incision(s), wounds(s) or drain site(s) healing without S/S of infection  Description: INTERVENTIONS  - Assess and document dressing, incision, wound bed, drain sites and surrounding tissue  - Provide patient and family education  - Perform skin care/dressing changes every 24 hrs   Outcome: Progressing     Problem: Potential for Falls  Goal: Patient will remain free of falls  Description: INTERVENTIONS:  - Educate patient/family on patient safety including physical limitations  - Instruct patient to call for assistance with activity   - Consult OT/PT to assist with strengthening/mobility   - Keep Call bell within reach  - Keep bed low and locked with side rails adjusted as appropriate  - Keep care items and personal belongings within reach  - Initiate and maintain comfort rounds  - Make Fall Risk Sign visible to staff  - Offer Toileting every 1-2 Hours, in advance of need  - Initiate/Maintain bed and chair alarm  - Obtain necessary fall risk management equipment: fall socks and call bell within reach  - Apply yellow socks and bracelet for high fall risk patients  - Consider moving patient to room near nurses station  Outcome: Progressing

## 2022-06-25 NOTE — ASSESSMENT & PLAN NOTE
Based upon presentation with leukocytosis and tachycardia - skin and soft tissue infection as likely source with dog treatment as above  Sepsis remains resolved  Unfortunately blood cultures were not obtained at time of admission

## 2022-06-25 NOTE — PROGRESS NOTES
Vancomycin IV Pharmacy-to-Dose Consultation    Elvis Jonas is a 54 y o  female who is currently receiving Vancomycin IV with management by the Pharmacy Consult service  Assessment/Plan:  The patient was reviewed  Renal function is stable and no signs or symptoms of nephrotoxicity and/or infusion reactions were documented in the chart  Based on todays assessment, continue current vancomycin (day # 2) dosing of 750mg q12h, with a plan for trough to be drawn at 0530 on 6/26/22  We will continue to follow the patients culture results and clinical progress daily      Rip Trevizo, Pharmacist

## 2022-06-25 NOTE — CONSULTS
Orthopedics   Jennifer Jackson 54 y o  female MRN: [id number]  Unit/Bed#: [facility]      Chief Complaint:   Right hand pain    HPI:  54 y o  right hand dominant female admitted to the hospital on 6/25/2022 after sustaining a dog bite to her right hand  The patient states that her dog bit her hand that morning, causing a puncture wound to the dorsal aspect of the hand  The patient states that she quickly cleaned the wound with water and betadine, though when the pain and swelling worsened throughout the day she presented to the ED  Hand surgery was consulted, the patient was provided with Boostrix, and started on IV Unasyn and warm water soaks  On 6/24/2022 it was noted by the [facility] team that the patient's hand swelling and erythema was worsening  The patient was started on IV vancomycin as well, and an MRI was ordered for further evaluation, which revealed a possible abscess  Today the patient states that the pain has improved since yesterday  She denies any numbness or tingling in the upper extremity  She complains of ongoing swelling along the dorsum of the hand and difficulty with flexion of the 2nd, 3rd, and 4th digits, though notes that this also appears improved today  The patient denies any fever or chills, shortness of breath, N/V/D, or headache  The patient notes that her dog is up to date on all vaccinations  The patient denies any drainage or bleeding from the wound  Review Of Systems:   · Skin: Normal  · Neuro: See HPI  · Musculoskeletal: See HPI  · 14 point review of systems negative except as stated above     Past Medical History:   History reviewed  No pertinent past medical history  Past Surgical History:   History reviewed  No pertinent surgical history  Family History:  Family history reviewed and non-contributory  History reviewed  No pertinent family history      Social History:  Social History     Socioeconomic History    Marital status: Single     Spouse name: None    Number of children: None  Years of education: None    Highest education level: None   Occupational History    None   Tobacco Use    Smoking status: Current Every Day Smoker     Packs/day: 0 50     Types: Cigarettes    Smokeless tobacco: Never Used   Substance and Sexual Activity    Alcohol use: Not Currently    Drug use: Never    Sexual activity: None   Other Topics Concern    None   Social History Narrative    None     Social Determinants of Health     Financial Resource Strain: Not on file   Food Insecurity: No Food Insecurity    Worried About Running Out of Food in the Last Year: Never true    Edith of Food in the Last Year: Never true   Transportation Needs: No Transportation Needs    Lack of Transportation (Medical): No    Lack of Transportation (Non-Medical):  No   Physical Activity: Not on file   Stress: Not on file   Social Connections: Not on file   Intimate Partner Violence: Not on file   Housing Stability: Low Risk     Unable to Pay for Housing in the Last Year: No    Number of Places Lived in the Last Year: 1    Unstable Housing in the Last Year: No       Allergies:   No Known Allergies        Labs:  0   Lab Value Date/Time    HCT 42 0 06/25/2022 0441    HCT 46 4 (H) 06/24/2022 1001    HCT 38 5 06/23/2022 0629    HGB 14 2 06/25/2022 0441    HGB 15 6 (H) 06/24/2022 1001    HGB 13 3 06/23/2022 0629    WBC 6 03 06/25/2022 0441    WBC 6 50 06/24/2022 1001    WBC 8 26 06/23/2022 0629       Meds:    Current Facility-Administered Medications:     acetaminophen (TYLENOL) tablet 650 mg, 650 mg, Oral, Q4H PRN, Yordan Stephens PA-C, 650 mg at 06/23/22 1019    ampicillin-sulbactam (UNASYN) 3 g in sodium chloride 0 9 % 100 mL IVPB, 3 g, Intravenous, Q6H, Ashlyfarhat Dumont PA-C, Last Rate: 200 mL/hr at 06/25/22 0037, 3 g at 06/25/22 0037    ARIPiprazole (ABILIFY) tablet 30 mg, 30 mg, Oral, Daily, Yordan Stephens PA-C, 30 mg at 06/25/22 0903    desvenlafaxine succinate (PRISTIQ) 24 hr tablet 50 mg, 50 mg, Oral, Daily, Moris Julian PA-C, 50 mg at 06/25/22 4934    gabapentin (NEURONTIN) capsule 100 mg, 100 mg, Oral, BID, Moris Julian PA-C, 100 mg at 06/25/22 8674    heparin (porcine) subcutaneous injection 5,000 Units, 5,000 Units, Subcutaneous, Q8H [address], 5,000 Units at 06/25/22 0512 **AND** [CANCELED] Platelet count, , , Once, Moris Julian PA-C    hydrOXYzine HCL (ATARAX) tablet 50 mg, 50 mg, Oral, Q6H PRN, Moris Julian PA-C    LORazepam (ATIVAN) tablet 1 mg, 1 mg, Oral, 60 Min Pre-Op, Michael Calderon PA-C, 1 mg at 06/24/22 1709    morphine injection 2 mg, 2 mg, Intravenous, Q1H PRN, Moris Julian PA-C    nicotine (NICODERM CQ) 21 mg/24 hr TD 24 hr patch 1 patch, 1 patch, Transdermal, Daily, Moris Julian PA-C, 1 patch at 06/24/22 0826    ondansetron (ZOFRAN) injection 4 mg, 4 mg, Intravenous, Q6H PRN, Moris Julian PA-C    oxyCODONE (ROXICODONE) immediate release tablet 10 mg, 10 mg, Oral, Q4H PRN, Moris Julian PA-C, 10 mg at 06/24/22 2215    oxyCODONE (ROXICODONE) IR tablet 5 mg, 5 mg, Oral, Q4H PRN, Moris Julian PA-C, 5 mg at 06/24/22 1238    topiramate (TOPAMAX) tablet 150 mg, 150 mg, Oral, Daily, Moris Julian PA-C, 150 mg at 06/25/22 0903    vancomycin (VANCOCIN) 750 mg in sodium chloride 0 9 % 250 mL IVPB, 750 mg, Intravenous, Q12H, Michael Calderon PA-C, Last Rate: 250 mL/hr at 06/25/22 0512, 750 mg at 06/25/22 8839    Blood Culture:   No results found for: BLOODCX    Wound Culture:   No results found for: WOUNDCULT    Ins and Outs:  I/O last 24 hours: In: 420 [P O :420]  Out: -           Physical Exam:   /73 (BP Location: Left arm)   Pulse 92   Temp (!) 97 2 °F (36 2 °C) (Temporal)   Resp 15   Ht 5' 3" (1 6 m)   Wt 68 kg (149 lb 14 6 oz)   SpO2 98%   BMI 26 56 kg/m²   Gen: Alert and oriented to person, place, time  HEENT: EOMI, eyes clear, moist mucus membranes, hearing intact  Respiratory: Bilateral chest rise  No audible wheezing found  Cardiovascular: Regular Rate and Rhythm  Abdomen: soft nontender/nondistended    Musculoskeletal: right upper extremity  · Patient currently lying in bed, resting, in no acute distress  · There is a small, 0 5cm puncture wound located along the dorsal aspect of the hand, between the second and third metacarpals  Surrounding erythema present which does not appear to extend beyond the dorsal hand at this time, no lymphangitis or lymphadenopathy  The puncture wound is scabbed, without any appreciable bleeding or drainage  There is also a small superficial laceration along the volar aspect of the base of the 2nd digit and another along the dorsal, radial aspect of the tip of the thumb  No erythema, swelling, warmth, palpable tenderness, or signs of infection along these smaller wounds  Please see media attached  · TTP along the dorsal aspect of the hand, 2nd, 3rd, and 4th metacarpals  No tenderness along the digits or the wrist    · Full active & passive ROM of the wrist and elbow without complaints  · Limited active flexion present along the 2nd and 3rd digits due to swelling  Composite fist lacks 1cm to full due to pain and swelling  Passively able to fully flex and extend MCP, PIP, and DIP joints of the 2nd, 3rd, and 4th digits with discomfort  · Active ROM of all other digits intact, no rotational deformities with ROM testing  · Negative Kanavel signs  · SILT m/r/u    · Motor intact ain/pin/m/r/u  · 2+ rad pulse  · Brisk cap refill in all fingers        Tertiary: no tenderness over all other joints/long bones as except already stated  Radiology:   I personally reviewed the films  X-ray of the right hand taken on 6/22/2022 demonstrates no signs of fracture or dislocation  No degenerative changes, bony lesions, or evidence of osteomyelitis      MRI of the right hand without contrast taken on 6/24/2022 demonstrates diffuse soft tissue swelling, collection along the dorsum of the hand which likely represents a hematoma vs  abscess      _*_*_*_*_*_*_*_*_*_*_*_*_*_*_*_*_*_*_*_*_*_*_*_*_*_*_*_*_*_*_*_*_*_*_*_*_*_*_*_*_*    Assessment:  54 y  o female with right hand infected dog bite wound  Condition appears to be improving at this time  Plan:   · WBAT RUE  · PT/OT  · Pain control per primary team  · DVT ppx per primary team  · Body mass index is 26 56 kg/m²  · Dispo: ortho will follow  · Patient case discussed with hand surgeon on call, Dr Zac Blair  At this time the patient's condition is improving, remains afebrile, WBC is trending down, and there is no clinical indication for immediate surgical intervention  The patient may be transitioned to oral antibiotics once medically stable for discharge, recommend Augmentin  · Also recommend warm, soapy water soaks three times a day, 15 minutes at a time  · Discussion had with the patient regarding outpatient follow up  The patient was advised that she may follow up with hand surgery down in [place] or with orthopedic SHERRELL Matos here in [place]  The patient has opted to stay local, as [place] is too far, and will see Mic Matos for ongoing observation of her hand once discharged  The patient should be seen in one week  · Please reach out if there are any questions!         Desmond Costello PA-C

## 2022-06-25 NOTE — QUICK NOTE
Received message from nurse re immediate finding on MRI right hand (possible small abscess adjacent to 2nd metacarpal)  Reviewed report/images, notes, labs, and examined patient  Patient reports increasing erythema/swelling/pain in right hand/forearm since admission, which precipitated MRI today  Pain controlled with current pain regimen and elevation of hand  Diffuse swelling in right hand, limiting AROM of fingers/hand/wrist   Neurovascularly intact  New erythema on extensor aspect of forearm noted; not very well-demarcated but faintly visible  Pictures uploaded to media tab  Antibiotics expanded earlier today  Patient on unasyn 3 grams Q6 hours since admit (5 doses to date), just had 1st dose of vancomycin today  Afebrile since admission  WBC trending down 11 6 -> 8 3 -> 6 5  MRSA swab of nares pending  Contacted on-call hand surgeon (Dr Corrinne Bonier) to discuss further  Recommends continuing current antibiotic regimen overnight  Dr Mariaelena Moreno to see patient tomorrow morning

## 2022-06-25 NOTE — PLAN OF CARE
Problem: PAIN - ADULT  Goal: Verbalizes/displays adequate comfort level or baseline comfort level  Description: Interventions:  - Encourage patient to monitor pain and request assistance  - Assess pain using appropriate pain scale  - Administer analgesics based on type and severity of pain and evaluate response  - Implement non-pharmacological measures as appropriate and evaluate response  - Consider cultural and social influences on pain and pain management  - Notify physician/advanced practitioner if interventions unsuccessful or patient reports new pain  Outcome: Progressing     Problem: INFECTION - ADULT  Goal: Absence or prevention of progression during hospitalization  Description: INTERVENTIONS:  - Assess and monitor for signs and symptoms of infection  - Monitor lab/diagnostic results  - Monitor all insertion sites, i e  indwelling lines, tubes, and drains  - Monitor endotracheal if appropriate and nasal secretions for changes in amount and color  - [place] appropriate cooling/warming therapies per order  - Administer medications as ordered  - Instruct and encourage patient and family to use good hand hygiene technique  - Identify and instruct in appropriate isolation precautions for identified infection/condition  Outcome: Progressing     Problem: SAFETY ADULT  Goal: Patient will remain free of falls  Description: INTERVENTIONS:  - Educate patient/family on patient safety including physical limitations  - Instruct patient to call for assistance with activity   - Consult OT/PT to assist with strengthening/mobility   - Keep Call bell within reach  - Keep bed low and locked with side rails adjusted as appropriate  - Keep care items and personal belongings within reach  - Initiate and maintain comfort rounds  - Make Fall Risk Sign visible to staff  - Offer Toileting every 1-2 Hours, in advance of need  - Initiate/Maintain bed and chair alarm  - Obtain necessary fall risk management equipment: fall socks and call bell within reach  - Apply yellow socks and bracelet for high fall risk patients  - Consider moving patient to room near nurses station  Outcome: Progressing  Goal: Maintain or return to baseline ADL function  Description: INTERVENTIONS:  -  Assess patient's ability to carry out ADLs; assess patient's baseline for ADL function and identify physical deficits which impact ability to perform ADLs (bathing, care of mouth/teeth, toileting, grooming, dressing, etc )  - Assess/evaluate cause of self-care deficits   - Assess range of motion  - Assess patient's mobility; develop plan if impaired  - Assess patient's need for assistive devices and provide as appropriate  - Encourage maximum independence but intervene and supervise when necessary  - Involve family in performance of ADLs  - Assess for home care needs following discharge   - Consider OT consult to assist with ADL evaluation and planning for discharge  - Provide patient education as appropriate  Outcome: Progressing  Goal: Maintains/Returns to pre admission functional level  Description: INTERVENTIONS:  - Perform BMAT or MOVE assessment daily    - Set and communicate daily mobility goal to care team and patient/family/caregiver  - Collaborate with rehabilitation services on mobility goals if consulted  - Perform Range of Motion 3-4 times a day  - Reposition patient every 1-2 hours    - Dangle patient 3-4 times a day  - Stand patient 3-4 times a day  - Ambulate patient 3-4 times a day  - Out of bed to chair 3-4 times a day   - Out of bed for meals 3-4 times a day  - Out of bed for toileting  - Record patient progress and toleration of activity level   Outcome: Progressing     Problem: DISCHARGE PLANNING  Goal: Discharge to home or other facility with appropriate resources  Description: INTERVENTIONS:  - Identify barriers to discharge w/patient and caregiver  - Arrange for needed discharge resources and transportation as appropriate  - Identify discharge learning needs (meds, wound care, etc )  - Arrange for interpretive services to assist at discharge as needed  - Refer to Case Management Department for coordinating discharge planning if the patient needs post-hospital services based on physician/advanced practitioner order or complex needs related to functional status, cognitive ability, or social support system  Outcome: Progressing     Problem: Knowledge Deficit  Goal: Patient/family/caregiver demonstrates understanding of disease process, treatment plan, medications, and discharge instructions  Description: Complete learning assessment and assess knowledge base    Interventions:  - Provide teaching at level of understanding  - Provide teaching via preferred learning methods  Outcome: Progressing     Problem: METABOLIC, FLUID AND ELECTROLYTES - ADULT  Goal: Electrolytes maintained within normal limits  Description: INTERVENTIONS:  - Monitor labs and assess patient for signs and symptoms of electrolyte imbalances  - Administer electrolyte replacement as ordered  - Monitor response to electrolyte replacements, including repeat lab results as appropriate  - Instruct patient on fluid and nutrition as appropriate  Outcome: Progressing  Goal: Fluid balance maintained  Description: INTERVENTIONS:  - Monitor labs   - Monitor I/O and WT  - Instruct patient on fluid and nutrition as appropriate  - Assess for signs & symptoms of volume excess or deficit  Outcome: Progressing  Goal: Glucose maintained within target range  Description: INTERVENTIONS:  - Monitor Blood Glucose as ordered  - Assess for signs and symptoms of hyperglycemia and hypoglycemia  - Administer ordered medications to maintain glucose within target range  - Assess nutritional intake and initiate nutrition service referral as needed  Outcome: Progressing     Problem: SKIN/TISSUE INTEGRITY - ADULT  Goal: Incision(s), wounds(s) or drain site(s) healing without S/S of infection  Description: INTERVENTIONS  - Assess and document dressing, incision, wound bed, drain sites and surrounding tissue  - Provide patient and family education  - Perform skin care/dressing changes every 24 hrs   Outcome: Progressing     Problem: Potential for Falls  Goal: Patient will remain free of falls  Description: INTERVENTIONS:  - Educate patient/family on patient safety including physical limitations  - Instruct patient to call for assistance with activity   - Consult OT/PT to assist with strengthening/mobility   - Keep Call bell within reach  - Keep bed low and locked with side rails adjusted as appropriate  - Keep care items and personal belongings within reach  - Initiate and maintain comfort rounds  - Make Fall Risk Sign visible to staff  - Offer Toileting every 1-2 Hours, in advance of need  - Initiate/Maintain bed and chair alarm  - Obtain necessary fall risk management equipment: fall socks and call bell within reach  - Apply yellow socks and bracelet for high fall risk patients  - Consider moving patient to room near nurses station  Outcome: Progressing

## 2022-06-25 NOTE — ASSESSMENT & PLAN NOTE
Presented with reported dog bite to right hand, with subsequent onset of significant erythema, swelling, and pain limiting motion  Patient reports mild improvement today  · Dogs reportedly up-to-date on their shots including rabies  · No fractures noted on x-ray of the right hand performed  · White blood cells elevated at 11 63 - now normal   · Patient received Boostrix in ED  · Initially on Unasyn, with vancomycin added secondary to lack of improvement  · Subsequent MRI with collection within the dorsal interosseous muscle adjacent to 2nd metacarpal - may represent abscess  Study was performed without contrast despite discussion with Radiology by provider  · Ortho consulted - hand surgeon on call advise is no clinical indication for immediate surgical intervention  Recommendations are for transition to oral antibiotics with Augmentin once stable, continued warm soapy water soaks 3 times a day, 50 minutes each time  Follow-up with hand surgery in [place] or with orthopedic SHERRELL [employer] locally  Recommendations for follow-up within 1 week of discharge

## 2022-06-25 NOTE — PROGRESS NOTES
[address]  Progress Note - Coreen Bettencourt 1970, 54 y o  female MRN: [id number]  Unit/Bed#: 410-01 Encounter: [id number]  Primary Care Provider: No primary care provider on file  Date and time admitted to hospital: 6/22/2022  8:17 PM    * Dog bite of right hand with infection  Assessment & Plan  Presented with reported dog bite to right hand, with subsequent onset of significant erythema, swelling, and pain limiting motion  Patient reports mild improvement today  · Dogs reportedly up-to-date on their shots including rabies  · No fractures noted on x-ray of the right hand performed  · White blood cells elevated at 11 63 - now normal   · Patient received Boostrix in ED  · Initially on Unasyn, with vancomycin added secondary to lack of improvement  · Subsequent MRI with collection within the dorsal interosseous muscle adjacent to 2nd metacarpal - may represent abscess  Study was performed without contrast despite discussion with Radiology by provider  · Ortho consulted - hand surgeon on call advise is no clinical indication for immediate surgical intervention  Recommendations are for transition to oral antibiotics with Augmentin once stable, continued warm soapy water soaks 3 times a day, 50 minutes each time  Follow-up with hand surgery in [place] or with orthopedic PA Cookie Mack locally  Recommendations for follow-up within 1 week of discharge  Sepsis [facility])  Assessment & Plan  Based upon presentation with leukocytosis and tachycardia - skin and soft tissue infection as likely source with dog treatment as above  Sepsis remains resolved  Unfortunately blood cultures were not obtained at time of admission  Bipolar disorder [facility])  Assessment & Plan  Continue Abilify, Pristiq, and Topamax  VTE Pharmacologic Prophylaxis: VTE Score: 10 High Risk (Score >/= 5) - Pharmacological DVT Prophylaxis Ordered: heparin  Sequential Compression Devices Ordered      Patient Centered Rounds: I performed bedside rounds with nursing staff today  Discussions with Specialists or Other Care Team Provider: Orthopedic surgery    Time Spent for Care: 30 minutes  More than 50% of total time spent on counseling and coordination of care as described above  Current Length of Stay: 1 day(s)  Current Patient Status: Inpatient   Certification Statement: The patient will continue to require additional inpatient hospital stay due to Continued need for IV antibiotics  Discharge Plan: Anticipate discharge in 48-72 hrs to home  Code Status: Level 1 - Full Code    Subjective:   Patient seen and examined - reports mild but definitive improvement in right hand symptoms  Orthopedic Surgery evaluation with no need for surgical intervention  No overnight events reported  Remains afebrile  Objective:     Vitals:   Temp (24hrs), Av 9 °F (36 6 °C), Min:97 2 °F (36 2 °C), Max:98 4 °F (36 9 °C)    Temp:  [97 2 °F (36 2 °C)-98 4 °F (36 9 °C)] 97 2 °F (36 2 °C)  HR:  [] 92  Resp:  [15] 15  BP: (106-118)/(72-78) 106/73  SpO2:  [97 %-98 %] 98 %  Body mass index is 26 56 kg/m²  Input and Output Summary (last 24 hours): Intake/Output Summary (Last 24 hours) at 2022 1259  Last data filed at 2022 0953  Gross per 24 hour   Intake 120 ml   Output --   Net 120 ml       Physical Exam:   Vitals reviewed  Constitutional:       General: She is not in acute distress  Appearance: She is not ill-appearing  Cardiovascular:      Rate and Rhythm: Normal rate and regular rhythm  Pulses: Normal pulses  Heart sounds: Normal heart sounds  No murmur heard  No gallop  Pulmonary:      Effort: Pulmonary effort is normal       Breath sounds: No wheezing, rhonchi or rales  Abdominal:      Tenderness: There is no abdominal tenderness  There is no guarding or rebound  Musculoskeletal:      Cervical back: Normal range of motion and neck supple  Right lower leg: No edema  Left lower leg: No edema  Comments: Limited movement of right hand and index finger, minimal ability to flex or extended rest of fingers  Continued ROM at wrist and elbow   Skin:     Comments: Right hand swelling and erythema  Neurological:      General: No focal deficit present  Mental Status: She is alert and oriented to person, place, and time  Psychiatric:         Mood and Affect: Mood normal          Behavior: Behavior normal      Additional Data:     Labs:  Results from last 7 days   Lab Units 06/25/22  0441   WBC Thousand/uL 6 03   HEMOGLOBIN g/dL 14 2   HEMATOCRIT % 42 0   PLATELETS Thousands/uL 183   NEUTROS PCT % 54   LYMPHS PCT % 37   MONOS PCT % 8   EOS PCT % 0     Results from last 7 days   Lab Units 06/25/22  0441 06/24/22  1001 06/23/22  0629   SODIUM mmol/L 137   < > 138   POTASSIUM mmol/L 3 9   < > 4 0   CHLORIDE mmol/L 107   < > 108   CO2 mmol/L 21   < > 21   BUN mg/dL 14   < > 15   CREATININE mg/dL 0 88   < > 1 11   ANION GAP mmol/L 9   < > 9   CALCIUM mg/dL 8 9   < > 8 6   ALBUMIN g/dL  --   --  3 1*   TOTAL BILIRUBIN mg/dL  --   --  0 58   ALK PHOS U/L  --   --  61   ALT U/L  --   --  26   AST U/L  --   --  24   GLUCOSE RANDOM mg/dL 97   < > 110    < > = values in this interval not displayed  Lines/Drains:  Invasive Devices  Report    Peripheral Intravenous Line  Duration           Peripheral IV 06/22/22 Left;Proximal;Ventral (anterior) Forearm 2 days              Imaging: Reviewed radiology reports from this admission including: MRI hand      Recent Cultures (last 7 days):         Last 24 Hours Medication List:   Current Facility-Administered Medications   Medication Dose Route Frequency Provider Last Rate    acetaminophen  650 mg Oral Q4H PRN Olga Soto PA-C      ampicillin-sulbactam  3 g Intravenous Q6H Patricia Pena PA-C 3 g (06/25/22 1250)    ARIPiprazole  30 mg Oral Daily Olga Soto PA-C      desvenlafaxine succinate  50 mg Oral Daily Sunitha Delgado EMERY Doan      gabapentin  100 mg Oral BID Waunita Margi, PA-ANIVAL      heparin (porcine)  5,000 Units Subcutaneous [facility] Toni Doan PA-C      hydrOXYzine HCL  50 mg Oral Q6H PRN Waunita Margi, EMERY      LORazepam  1 mg Oral [address] [facility] Franceen Dose, [place]      morphine injection  2 mg Intravenous Q1H PRN Waunita [place], EMERY      nicotine  1 patch Transdermal Daily Waunita [place], PA-ANIVAL      ondansetron  4 mg Intravenous Q6H PRN Waunita [place], PA-ANIVAL      oxyCODONE  10 mg Oral Q4H PRN Waunita [place], PA-ANIVAL      oxyCODONE  5 mg Oral Q4H PRN Waunita Margi, PA-ANIVAL      topiramate  150 mg Oral Daily Waunita Margi, PA-C      IVPB builder  750 mg Intravenous Q12H Franceen Dose, PA-C 750 mg (06/25/22 9230)        Today, Patient Was Seen By: Delmer Hubbard MD    **Please Note: This note may have been constructed using a voice recognition system  **

## 2022-06-26 LAB
ANION GAP SERPL CALCULATED.3IONS-SCNC: 12 MMOL/L (ref 4–13)
BASOPHILS # BLD AUTO: 0.02 THOUSANDS/ΜL (ref 0–0.1)
BASOPHILS NFR BLD AUTO: 0 % (ref 0–1)
BUN SERPL-MCNC: 16 MG/DL (ref 5–25)
CALCIUM SERPL-MCNC: 8.8 MG/DL (ref 8.3–10.1)
CHLORIDE SERPL-SCNC: 106 MMOL/L (ref 100–108)
CO2 SERPL-SCNC: 20 MMOL/L (ref 21–32)
CREAT SERPL-MCNC: 1.01 MG/DL (ref 0.6–1.3)
EOSINOPHIL # BLD AUTO: 0 THOUSAND/ΜL (ref 0–0.61)
EOSINOPHIL NFR BLD AUTO: 0 % (ref 0–6)
ERYTHROCYTE [DISTWIDTH] IN BLOOD BY AUTOMATED COUNT: 12.4 % (ref 11.6–15.1)
GFR SERPL CREATININE-BSD FRML MDRD: 65 ML/MIN/1.73SQ M
GLUCOSE SERPL-MCNC: 101 MG/DL (ref 65–140)
HCT VFR BLD AUTO: 42.1 % (ref 34.8–46.1)
HGB BLD-MCNC: 14.2 G/DL (ref 11.5–15.4)
IMM GRANULOCYTES # BLD AUTO: 0.01 THOUSAND/UL (ref 0–0.2)
IMM GRANULOCYTES NFR BLD AUTO: 0 % (ref 0–2)
LYMPHOCYTES # BLD AUTO: 1.81 THOUSANDS/ΜL (ref 0.6–4.47)
LYMPHOCYTES NFR BLD AUTO: 39 % (ref 14–44)
MAGNESIUM SERPL-MCNC: 2.3 MG/DL (ref 1.6–2.6)
MCH RBC QN AUTO: 30.6 PG (ref 26.8–34.3)
MCHC RBC AUTO-ENTMCNC: 33.7 G/DL (ref 31.4–37.4)
MCV RBC AUTO: 91 FL (ref 82–98)
MONOCYTES # BLD AUTO: 0.43 THOUSAND/ΜL (ref 0.17–1.22)
MONOCYTES NFR BLD AUTO: 9 % (ref 4–12)
NEUTROPHILS # BLD AUTO: 2.37 THOUSANDS/ΜL (ref 1.85–7.62)
NEUTS SEG NFR BLD AUTO: 52 % (ref 43–75)
NRBC BLD AUTO-RTO: 0 /100 WBCS
PLATELET # BLD AUTO: 195 THOUSANDS/UL (ref 149–390)
PMV BLD AUTO: 9.2 FL (ref 8.9–12.7)
POTASSIUM SERPL-SCNC: 3.9 MMOL/L (ref 3.5–5.3)
RBC # BLD AUTO: 4.64 MILLION/UL (ref 3.81–5.12)
SODIUM SERPL-SCNC: 138 MMOL/L (ref 136–145)
VANCOMYCIN TROUGH SERPL-MCNC: 13.2 UG/ML (ref 10–20)
WBC # BLD AUTO: 4.64 THOUSAND/UL (ref 4.31–10.16)

## 2022-06-26 PROCEDURE — 85025 COMPLETE CBC W/AUTO DIFF WBC: CPT | Performed by: INTERNAL MEDICINE

## 2022-06-26 PROCEDURE — 99232 SBSQ HOSP IP/OBS MODERATE 35: CPT | Performed by: INTERNAL MEDICINE

## 2022-06-26 PROCEDURE — 80048 BASIC METABOLIC PNL TOTAL CA: CPT | Performed by: INTERNAL MEDICINE

## 2022-06-26 PROCEDURE — 83735 ASSAY OF MAGNESIUM: CPT | Performed by: INTERNAL MEDICINE

## 2022-06-26 PROCEDURE — 80202 ASSAY OF VANCOMYCIN: CPT | Performed by: PHYSICIAN ASSISTANT

## 2022-06-26 RX ORDER — POTASSIUM CHLORIDE 750 MG/1
10 TABLET, EXTENDED RELEASE ORAL ONCE
Status: COMPLETED | OUTPATIENT
Start: 2022-06-26 | End: 2022-06-26

## 2022-06-26 RX ADMIN — HEPARIN SODIUM 5000 UNITS: 5000 INJECTION INTRAVENOUS; SUBCUTANEOUS at 14:00

## 2022-06-26 RX ADMIN — DESVENLAFAXINE 50 MG: 50 TABLET, FILM COATED, EXTENDED RELEASE ORAL at 08:59

## 2022-06-26 RX ADMIN — HEPARIN SODIUM 5000 UNITS: 5000 INJECTION INTRAVENOUS; SUBCUTANEOUS at 22:13

## 2022-06-26 RX ADMIN — AMPICILLIN SODIUM AND SULBACTAM SODIUM 3 G: 2; 1 INJECTION, POWDER, FOR SOLUTION INTRAMUSCULAR; INTRAVENOUS at 14:00

## 2022-06-26 RX ADMIN — VANCOMYCIN HYDROCHLORIDE 750 MG: 5 INJECTION, POWDER, LYOPHILIZED, FOR SOLUTION INTRAVENOUS at 06:20

## 2022-06-26 RX ADMIN — AMPICILLIN SODIUM AND SULBACTAM SODIUM 3 G: 2; 1 INJECTION, POWDER, FOR SOLUTION INTRAMUSCULAR; INTRAVENOUS at 19:41

## 2022-06-26 RX ADMIN — GABAPENTIN 100 MG: 100 CAPSULE ORAL at 08:57

## 2022-06-26 RX ADMIN — TOPIRAMATE 150 MG: 100 TABLET, FILM COATED ORAL at 08:57

## 2022-06-26 RX ADMIN — AMPICILLIN SODIUM AND SULBACTAM SODIUM 3 G: 2; 1 INJECTION, POWDER, FOR SOLUTION INTRAMUSCULAR; INTRAVENOUS at 00:17

## 2022-06-26 RX ADMIN — VANCOMYCIN HYDROCHLORIDE 750 MG: 5 INJECTION, POWDER, LYOPHILIZED, FOR SOLUTION INTRAVENOUS at 17:21

## 2022-06-26 RX ADMIN — HEPARIN SODIUM 5000 UNITS: 5000 INJECTION INTRAVENOUS; SUBCUTANEOUS at 05:38

## 2022-06-26 RX ADMIN — AMPICILLIN SODIUM AND SULBACTAM SODIUM 3 G: 2; 1 INJECTION, POWDER, FOR SOLUTION INTRAMUSCULAR; INTRAVENOUS at 23:32

## 2022-06-26 RX ADMIN — NICOTINE 1 PATCH: 21 PATCH, EXTENDED RELEASE TRANSDERMAL at 08:57

## 2022-06-26 RX ADMIN — GABAPENTIN 100 MG: 100 CAPSULE ORAL at 17:21

## 2022-06-26 RX ADMIN — ARIPIPRAZOLE 30 MG: 10 TABLET ORAL at 08:57

## 2022-06-26 RX ADMIN — POTASSIUM CHLORIDE 10 MEQ: 750 TABLET, EXTENDED RELEASE ORAL at 08:57

## 2022-06-26 RX ADMIN — AMPICILLIN SODIUM AND SULBACTAM SODIUM 3 G: 2; 1 INJECTION, POWDER, FOR SOLUTION INTRAMUSCULAR; INTRAVENOUS at 05:38

## 2022-06-26 NOTE — ASSESSMENT & PLAN NOTE
Presented with reported dog bite to right hand, with subsequent onset of significant erythema, swelling, and pain limiting motion  Patient reports mild improvement today  · Dogs reportedly up-to-date on their shots, including rabies  · No fractures noted on x-ray of the right hand  · White blood cells elevated at 11 63 - now normal   · Patient received Boostrix in ED  · Initially on Unasyn, with vancomycin added secondary to lack of improvement  · MRI with collection within the dorsal interosseous muscle adjacent to 2nd metacarpal - may represent abscess  · Ortho consulted - hand surgeon on call with no clinical indication for immediate surgical intervention  Recommendations are for transition to oral antibiotics with Augmentin once stable, continued warm soapy water soaks 3 times a day, 15 minutes each time  Follow-up with hand surgery in [place] or with orthopedic PA Fco Shankar locally  Recommendations for follow-up within 1 week of discharge

## 2022-06-26 NOTE — PLAN OF CARE
Problem: PAIN - ADULT  Goal: Verbalizes/displays adequate comfort level or baseline comfort level  Description: Interventions:  - Encourage patient to monitor pain and request assistance  - Assess pain using appropriate pain scale  - Administer analgesics based on type and severity of pain and evaluate response  - Implement non-pharmacological measures as appropriate and evaluate response  - Consider cultural and social influences on pain and pain management  - Notify physician/advanced practitioner if interventions unsuccessful or patient reports new pain  Outcome: Progressing     Problem: INFECTION - ADULT  Goal: Absence or prevention of progression during hospitalization  Description: INTERVENTIONS:  - Assess and monitor for signs and symptoms of infection  - Monitor lab/diagnostic results  - Monitor all insertion sites, i e  indwelling lines, tubes, and drains  - Monitor endotracheal if appropriate and nasal secretions for changes in amount and color  - [place] appropriate cooling/warming therapies per order  - Administer medications as ordered  - Instruct and encourage patient and family to use good hand hygiene technique  - Identify and instruct in appropriate isolation precautions for identified infection/condition  Outcome: Progressing     Problem: SAFETY ADULT  Goal: Patient will remain free of falls  Description: INTERVENTIONS:  - Educate patient/family on patient safety including physical limitations  - Instruct patient to call for assistance with activity   - Consult OT/PT to assist with strengthening/mobility   - Keep Call bell within reach  - Keep bed low and locked with side rails adjusted as appropriate  - Keep care items and personal belongings within reach  - Initiate and maintain comfort rounds  - Make Fall Risk Sign visible to staff  - Offer Toileting every 1-2 Hours, in advance of need  - Initiate/Maintain bed and chair alarm  - Obtain necessary fall risk management equipment: fall socks and call bell within reach  - Apply yellow socks and bracelet for high fall risk patients  - Consider moving patient to room near nurses station  Outcome: Progressing  Goal: Maintain or return to baseline ADL function  Description: INTERVENTIONS:  -  Assess patient's ability to carry out ADLs; assess patient's baseline for ADL function and identify physical deficits which impact ability to perform ADLs (bathing, care of mouth/teeth, toileting, grooming, dressing, etc )  - Assess/evaluate cause of self-care deficits   - Assess range of motion  - Assess patient's mobility; develop plan if impaired  - Assess patient's need for assistive devices and provide as appropriate  - Encourage maximum independence but intervene and supervise when necessary  - Involve family in performance of ADLs  - Assess for home care needs following discharge   - Consider OT consult to assist with ADL evaluation and planning for discharge  - Provide patient education as appropriate  Outcome: Progressing  Goal: Maintains/Returns to pre admission functional level  Description: INTERVENTIONS:  - Perform BMAT or MOVE assessment daily    - Set and communicate daily mobility goal to care team and patient/family/caregiver  - Collaborate with rehabilitation services on mobility goals if consulted  - Perform Range of Motion 3-4 times a day  - Reposition patient every 1-2 hours    - Dangle patient 3-4 times a day  - Stand patient 3-4 times a day  - Ambulate patient 3-4 times a day  - Out of bed to chair 3-4 times a day   - Out of bed for meals 3-4 times a day  - Out of bed for toileting  - Record patient progress and toleration of activity level   Outcome: Progressing     Problem: DISCHARGE PLANNING  Goal: Discharge to home or other facility with appropriate resources  Description: INTERVENTIONS:  - Identify barriers to discharge w/patient and caregiver  - Arrange for needed discharge resources and transportation as appropriate  - Identify discharge learning needs (meds, wound care, etc )  - Arrange for interpretive services to assist at discharge as needed  - Refer to Case Management Department for coordinating discharge planning if the patient needs post-hospital services based on physician/advanced practitioner order or complex needs related to functional status, cognitive ability, or social support system  Outcome: Progressing     Problem: Knowledge Deficit  Goal: Patient/family/caregiver demonstrates understanding of disease process, treatment plan, medications, and discharge instructions  Description: Complete learning assessment and assess knowledge base    Interventions:  - Provide teaching at level of understanding  - Provide teaching via preferred learning methods  Outcome: Progressing     Problem: METABOLIC, FLUID AND ELECTROLYTES - ADULT  Goal: Electrolytes maintained within normal limits  Description: INTERVENTIONS:  - Monitor labs and assess patient for signs and symptoms of electrolyte imbalances  - Administer electrolyte replacement as ordered  - Monitor response to electrolyte replacements, including repeat lab results as appropriate  - Instruct patient on fluid and nutrition as appropriate  Outcome: Progressing  Goal: Fluid balance maintained  Description: INTERVENTIONS:  - Monitor labs   - Monitor I/O and WT  - Instruct patient on fluid and nutrition as appropriate  - Assess for signs & symptoms of volume excess or deficit  Outcome: Progressing  Goal: Glucose maintained within target range  Description: INTERVENTIONS:  - Monitor Blood Glucose as ordered  - Assess for signs and symptoms of hyperglycemia and hypoglycemia  - Administer ordered medications to maintain glucose within target range  - Assess nutritional intake and initiate nutrition service referral as needed  Outcome: Progressing     Problem: SKIN/TISSUE INTEGRITY - ADULT  Goal: Incision(s), wounds(s) or drain site(s) healing without S/S of infection  Description: INTERVENTIONS  - Assess and document dressing, incision, wound bed, drain sites and surrounding tissue  - Provide patient and family education  - Perform skin care/dressing changes every 24 hrs   Outcome: Progressing     Problem: Potential for Falls  Goal: Patient will remain free of falls  Description: INTERVENTIONS:  - Educate patient/family on patient safety including physical limitations  - Instruct patient to call for assistance with activity   - Consult OT/PT to assist with strengthening/mobility   - Keep Call bell within reach  - Keep bed low and locked with side rails adjusted as appropriate  - Keep care items and personal belongings within reach  - Initiate and maintain comfort rounds  - Make Fall Risk Sign visible to staff  - Offer Toileting every 1-2 Hours, in advance of need  - Initiate/Maintain bed and chair alarm  - Obtain necessary fall risk management equipment: fall socks and call bell within reach  - Apply yellow socks and bracelet for high fall risk patients  - Consider moving patient to room near nurses station  Outcome: Progressing

## 2022-06-26 NOTE — PROGRESS NOTES
Vancomycin Assessment    Dossimp Hoff is a 54 y o  female who is currently receiving vancomycin 750mg q12h for skin-soft tissue infection     Relevant clinical data and objective history reviewed:  Creatinine   Date Value Ref Range Status   06/26/2022 1 01 0 60 - 1 30 mg/dL Final     Comment:     Standardized to IDMS reference method   06/25/2022 0 88 0 60 - 1 30 mg/dL Final     Comment:     Standardized to IDMS reference method   06/24/2022 1 09 0 60 - 1 30 mg/dL Final     Comment:     Standardized to IDMS reference method     BP 94/67 (BP Location: Left arm)   Pulse 92   Temp 97 5 °F (36 4 °C) (Oral)   Resp 18   Ht 5' 3" (1 6 m)   Wt 62 9 kg (138 lb 10 7 oz)   SpO2 96%   BMI 24 56 kg/m²   I/O last 3 completed shifts: In: 480 [P O :480]  Out: -   Lab Results   Component Value Date/Time    BUN 16 06/26/2022 05:37 AM    WBC 4 64 06/26/2022 05:37 AM    HGB 14 2 06/26/2022 05:37 AM    HCT 42 1 06/26/2022 05:37 AM    MCV 91 06/26/2022 05:37 AM     06/26/2022 05:37 AM     Temp Readings from Last 3 Encounters:   06/26/22 97 5 °F (36 4 °C) (Oral)     Vancomycin Days of Therapy: 3    Assessment/Plan  The patient is currently on vancomycin utilizing scheduled dosing  Baseline risks associated with therapy include: pre-existing renal impairment and dehydration  The patient is receiving 750mg q12h with the most recent vancomycin level being not at steady-state and subtherapeutic based on a goal of 15-20 (appropriate for most indications) ; therefore, is clinically appropriate and dose will be continued   Pharmacy will continue to follow closely for s/sx of nephrotoxicity, infusion reactions, and appropriateness of therapy  BMP and CBC will be ordered per protocol  Plan for trough as patient approaches steady state, prior to the 4th  dose at approximately 0500 on 6/28/22  Pharmacy will continue to follow the patients culture results and clinical progress daily      Staci Alford, Pharmacist

## 2022-06-26 NOTE — ASSESSMENT & PLAN NOTE
Based upon presentation with leukocytosis and tachycardia - skin and soft tissue infection as likely source with dog treatment as above  Sepsis remains resolved  Unfortunately blood cultures were not obtained at time of admission  repositioned/plan of care explained/side rails up plan of care explained/repositioned/wait time explained/side rails up wait time explained/side rails up/plan of care explained

## 2022-06-26 NOTE — PLAN OF CARE
Problem: PAIN - ADULT  Goal: Verbalizes/displays adequate comfort level or baseline comfort level  Description: Interventions:  - Encourage patient to monitor pain and request assistance  - Assess pain using appropriate pain scale  - Administer analgesics based on type and severity of pain and evaluate response  - Implement non-pharmacological measures as appropriate and evaluate response  - Consider cultural and social influences on pain and pain management  - Notify physician/advanced practitioner if interventions unsuccessful or patient reports new pain  Outcome: Progressing     Problem: INFECTION - ADULT  Goal: Absence or prevention of progression during hospitalization  Description: INTERVENTIONS:  - Assess and monitor for signs and symptoms of infection  - Monitor lab/diagnostic results  - Monitor all insertion sites, i e  indwelling lines, tubes, and drains  - Monitor endotracheal if appropriate and nasal secretions for changes in amount and color  - [place] appropriate cooling/warming therapies per order  - Administer medications as ordered  - Instruct and encourage patient and family to use good hand hygiene technique  - Identify and instruct in appropriate isolation precautions for identified infection/condition  Outcome: Progressing     Problem: SAFETY ADULT  Goal: Patient will remain free of falls  Description: INTERVENTIONS:  - Educate patient/family on patient safety including physical limitations  - Instruct patient to call for assistance with activity   - Consult OT/PT to assist with strengthening/mobility   - Keep Call bell within reach  - Keep bed low and locked with side rails adjusted as appropriate  - Keep care items and personal belongings within reach  - Initiate and maintain comfort rounds  - Make Fall Risk Sign visible to staff  - Offer Toileting every 1-2 Hours, in advance of need  - Initiate/Maintain bed and chair alarm  - Obtain necessary fall risk management equipment: fall socks and call bell within reach  - Apply yellow socks and bracelet for high fall risk patients  - Consider moving patient to room near nurses station  Outcome: Progressing  Goal: Maintain or return to baseline ADL function  Description: INTERVENTIONS:  -  Assess patient's ability to carry out ADLs; assess patient's baseline for ADL function and identify physical deficits which impact ability to perform ADLs (bathing, care of mouth/teeth, toileting, grooming, dressing, etc )  - Assess/evaluate cause of self-care deficits   - Assess range of motion  - Assess patient's mobility; develop plan if impaired  - Assess patient's need for assistive devices and provide as appropriate  - Encourage maximum independence but intervene and supervise when necessary  - Involve family in performance of ADLs  - Assess for home care needs following discharge   - Consider OT consult to assist with ADL evaluation and planning for discharge  - Provide patient education as appropriate  Outcome: Progressing  Goal: Maintains/Returns to pre admission functional level  Description: INTERVENTIONS:  - Perform BMAT or MOVE assessment daily    - Set and communicate daily mobility goal to care team and patient/family/caregiver  - Collaborate with rehabilitation services on mobility goals if consulted  - Perform Range of Motion 3-4 times a day  - Reposition patient every 1-2 hours    - Dangle patient 3-4 times a day  - Stand patient 3-4 times a day  - Ambulate patient 3-4 times a day  - Out of bed to chair 3-4 times a day   - Out of bed for meals 3-4 times a day  - Out of bed for toileting  - Record patient progress and toleration of activity level   Outcome: Progressing     Problem: DISCHARGE PLANNING  Goal: Discharge to home or other facility with appropriate resources  Description: INTERVENTIONS:  - Identify barriers to discharge w/patient and caregiver  - Arrange for needed discharge resources and transportation as appropriate  - Identify discharge learning needs (meds, wound care, etc )  - Arrange for interpretive services to assist at discharge as needed  - Refer to Case Management Department for coordinating discharge planning if the patient needs post-hospital services based on physician/advanced practitioner order or complex needs related to functional status, cognitive ability, or social support system  Outcome: Progressing     Problem: Knowledge Deficit  Goal: Patient/family/caregiver demonstrates understanding of disease process, treatment plan, medications, and discharge instructions  Description: Complete learning assessment and assess knowledge base    Interventions:  - Provide teaching at level of understanding  - Provide teaching via preferred learning methods  Outcome: Progressing     Problem: METABOLIC, FLUID AND ELECTROLYTES - ADULT  Goal: Electrolytes maintained within normal limits  Description: INTERVENTIONS:  - Monitor labs and assess patient for signs and symptoms of electrolyte imbalances  - Administer electrolyte replacement as ordered  - Monitor response to electrolyte replacements, including repeat lab results as appropriate  - Instruct patient on fluid and nutrition as appropriate  Outcome: Progressing  Goal: Fluid balance maintained  Description: INTERVENTIONS:  - Monitor labs   - Monitor I/O and WT  - Instruct patient on fluid and nutrition as appropriate  - Assess for signs & symptoms of volume excess or deficit  Outcome: Progressing  Goal: Glucose maintained within target range  Description: INTERVENTIONS:  - Monitor Blood Glucose as ordered  - Assess for signs and symptoms of hyperglycemia and hypoglycemia  - Administer ordered medications to maintain glucose within target range  - Assess nutritional intake and initiate nutrition service referral as needed  Outcome: Progressing     Problem: SKIN/TISSUE INTEGRITY - ADULT  Goal: Incision(s), wounds(s) or drain site(s) healing without S/S of infection  Description: INTERVENTIONS  - Assess and document dressing, incision, wound bed, drain sites and surrounding tissue  - Provide patient and family education  - Perform skin care/dressing changes every 24 hrs   Outcome: Progressing     Problem: Potential for Falls  Goal: Patient will remain free of falls  Description: INTERVENTIONS:  - Educate patient/family on patient safety including physical limitations  - Instruct patient to call for assistance with activity   - Consult OT/PT to assist with strengthening/mobility   - Keep Call bell within reach  - Keep bed low and locked with side rails adjusted as appropriate  - Keep care items and personal belongings within reach  - Initiate and maintain comfort rounds  - Make Fall Risk Sign visible to staff  - Offer Toileting every 1-2 Hours, in advance of need  - Initiate/Maintain bed and chair alarm  - Obtain necessary fall risk management equipment: fall socks and call bell within reach  - Apply yellow socks and bracelet for high fall risk patients  - Consider moving patient to room near nurses station  Outcome: Progressing

## 2022-06-26 NOTE — PROGRESS NOTES
[address] [facility] [address]  Progress Note - Mariya Lind 1970, 54 y o  female MRN: [id number]  Unit/Bed#: 410-01 Encounter: [id number]  Primary Care Provider: No primary care provider on file  Date and time admitted to hospital: 6/22/2022  8:17 PM    * Dog bite of right hand with infection  Assessment & Plan  Presented with reported dog bite to right hand, with subsequent onset of significant erythema, swelling, and pain limiting motion  Patient reports mild improvement today  · Dogs reportedly up-to-date on their shots, including rabies  · No fractures noted on x-ray of the right hand  · White blood cells elevated at 11 63 - now normal   · Patient received Boostrix in ED  · Initially on Unasyn, with vancomycin added secondary to lack of improvement  · MRI with collection within the dorsal interosseous muscle adjacent to 2nd metacarpal - may represent abscess  · Ortho consulted - hand surgeon on call with no clinical indication for immediate surgical intervention  Recommendations are for transition to oral antibiotics with Augmentin once stable, continued warm soapy water soaks 3 times a day, 15 minutes each time  Follow-up with hand surgery in [place] or with orthopedic PA Fabio prince  Recommendations for follow-up within 1 week of discharge  Sepsis [facility])  Assessment & Plan  Based upon presentation with leukocytosis and tachycardia - skin and soft tissue infection as likely source with dog treatment as above  Sepsis remains resolved  Unfortunately blood cultures were not obtained at time of admission  Bipolar disorder [facility])  Assessment & Plan  Continue Abilify, Pristiq, and Topamax  VTE Pharmacologic Prophylaxis: VTE Score: 10 High Risk (Score >/= 5) - Pharmacological DVT Prophylaxis Ordered: heparin  Sequential Compression Devices Ordered      Patient Centered Rounds: I performed bedside rounds with nursing staff today    Discussions with Specialists or Other Care Team Provider: Orthopedic surgery     Time Spent for Care: 20 minutes  More than 50% of total time spent on counseling and coordination of care as described above      Current Length of Stay: 2 day(s)  Current Patient Status: Inpatient   Certification Statement: The patient will continue to require additional inpatient hospital stay due to Continued need for IV antibiotics  Discharge Plan: Anticipate discharge in 24-48 hrs to home      Code Status: Level 1 - Full Code    Subjective:   Patient reports significant improvement in pain, swelling, and movement of her hand  Had drainage overnight with use of hot compresses  No overnight events reported  Remains afebrile  Objective:     Vitals:   Temp (24hrs), Av 7 °F (36 5 °C), Min:97 5 °F (36 4 °C), Max:97 8 °F (36 6 °C)    Temp:  [97 5 °F (36 4 °C)-97 8 °F (36 6 °C)] 97 5 °F (36 4 °C)  HR:  [91-92] 92  Resp:  [17-18] 18  BP: ()/(67) 94/67  SpO2:  [96 %] 96 %  Body mass index is 24 56 kg/m²  Input and Output Summary (last 24 hours): Intake/Output Summary (Last 24 hours) at 2022 1334  Last data filed at 2022 1304  Gross per 24 hour   Intake 1080 ml   Output --   Net 1080 ml       Physical Exam:   Vitals reviewed  Constitutional:       General: She is not in acute distress      Appearance: She is not ill-appearing  Musculoskeletal:      Cervical back: Normal range of motion and neck supple       Right lower leg: No edema       Left lower leg: No edema       Comments:  initial limited movement of right hand and index finger, minimal ability to flex or extended rest of fingers  Movement has significantly improved today  Skin:     Comments: Right hand swelling and erythema significantly improved  Neurological:      General: No focal deficit present       Mental Status: She is alert and oriented to person, place, and time     Psychiatric: Paddy Easton and Affect: Mood normal          Behavior: Behavior normal       Additional Data: Labs:  Results from last 7 days   Lab Units 06/26/22  0537   WBC Thousand/uL 4 64   HEMOGLOBIN g/dL 14 2   HEMATOCRIT % 42 1   PLATELETS Thousands/uL 195   NEUTROS PCT % 52   LYMPHS PCT % 39   MONOS PCT % 9   EOS PCT % 0     Results from last 7 days   Lab Units 06/26/22  0537 06/24/22  1001 06/23/22  0629   SODIUM mmol/L 138   < > 138   POTASSIUM mmol/L 3 9   < > 4 0   CHLORIDE mmol/L 106   < > 108   CO2 mmol/L 20*   < > 21   BUN mg/dL 16   < > 15   CREATININE mg/dL 1 01   < > 1 11   ANION GAP mmol/L 12   < > 9   CALCIUM mg/dL 8 8   < > 8 6   ALBUMIN g/dL  --   --  3 1*   TOTAL BILIRUBIN mg/dL  --   --  0 58   ALK PHOS U/L  --   --  61   ALT U/L  --   --  26   AST U/L  --   --  24   GLUCOSE RANDOM mg/dL 101   < > 110    < > = values in this interval not displayed  Lines/Drains:  Invasive Devices  Report    Peripheral Intravenous Line  Duration           Peripheral IV 06/22/22 Left;Proximal;Ventral (anterior) Forearm 3 days              Imaging: No pertinent imaging reviewed      Recent Cultures (last 7 days):         Last 24 Hours Medication List:   Current Facility-Administered Medications   Medication Dose Route Frequency Provider Last Rate    acetaminophen  650 mg Oral Q4H PRN Bishop Johnnie PA-C      ampicillin-sulbactam  3 g Intravenous Q6H Rupert Phelan PA-C 3 g (06/26/22 0538)    ARIPiprazole  30 mg Oral Daily Bishop Johnnie PA-C      desvenlafaxine succinate  50 mg Oral Daily Bishop Johnnie PA-C      gabapentin  100 mg Oral BID Bishop Johnnie PA-C      heparin (porcine)  5,000 Units Subcutaneous [facility] Toni Doan PA-C      hydrOXYzine HCL  50 mg Oral Q6H PRN Bishop Johnnie PA-C      LORazepam  1 mg Oral [facility] Rupert Phelan PA-C      morphine injection  2 mg Intravenous Q1H PRN Bishop Johnnie PA-C      nicotine  1 patch Transdermal Daily Toni Doan PA-C      ondansetron  4 mg Intravenous Q6H PRN Lucien Joe PA-C      oxyCODONE  10 mg Oral Q4H PRN Lucien Joe PA-C      oxyCODONE  5 mg Oral Q4H PRN Lucien Joe PA-C      topiramate  150 mg Oral Daily Lucien Joe PA-C      IVPB builder  750 mg Intravenous Q12H Hortencia Lay PA-C 750 mg (06/26/22 2103)        Today, Patient Was Seen By: Paola Mcmahan MD    **Please Note: This note may have been constructed using a voice recognition system  **

## 2022-06-27 VITALS
BODY MASS INDEX: 24.8 KG/M2 | TEMPERATURE: 97.8 F | HEIGHT: 63 IN | WEIGHT: 139.99 LBS | OXYGEN SATURATION: 99 % | HEART RATE: 83 BPM | DIASTOLIC BLOOD PRESSURE: 68 MMHG | SYSTOLIC BLOOD PRESSURE: 107 MMHG | RESPIRATION RATE: 16 BRPM

## 2022-06-27 LAB
ANION GAP SERPL CALCULATED.3IONS-SCNC: 11 MMOL/L (ref 4–13)
BASOPHILS # BLD AUTO: 0.01 THOUSANDS/ΜL (ref 0–0.1)
BASOPHILS NFR BLD AUTO: 0 % (ref 0–1)
BUN SERPL-MCNC: 12 MG/DL (ref 5–25)
CALCIUM SERPL-MCNC: 8.9 MG/DL (ref 8.3–10.1)
CHLORIDE SERPL-SCNC: 109 MMOL/L (ref 100–108)
CO2 SERPL-SCNC: 20 MMOL/L (ref 21–32)
CREAT SERPL-MCNC: 0.87 MG/DL (ref 0.6–1.3)
EOSINOPHIL # BLD AUTO: 0 THOUSAND/ΜL (ref 0–0.61)
EOSINOPHIL NFR BLD AUTO: 0 % (ref 0–6)
ERYTHROCYTE [DISTWIDTH] IN BLOOD BY AUTOMATED COUNT: 12.3 % (ref 11.6–15.1)
GFR SERPL CREATININE-BSD FRML MDRD: 78 ML/MIN/1.73SQ M
GLUCOSE SERPL-MCNC: 107 MG/DL (ref 65–140)
HCT VFR BLD AUTO: 41.1 % (ref 34.8–46.1)
HGB BLD-MCNC: 14 G/DL (ref 11.5–15.4)
IMM GRANULOCYTES # BLD AUTO: 0.02 THOUSAND/UL (ref 0–0.2)
IMM GRANULOCYTES NFR BLD AUTO: 0 % (ref 0–2)
LYMPHOCYTES # BLD AUTO: 2.04 THOUSANDS/ΜL (ref 0.6–4.47)
LYMPHOCYTES NFR BLD AUTO: 33 % (ref 14–44)
MAGNESIUM SERPL-MCNC: 2.1 MG/DL (ref 1.6–2.6)
MCH RBC QN AUTO: 30.9 PG (ref 26.8–34.3)
MCHC RBC AUTO-ENTMCNC: 34.1 G/DL (ref 31.4–37.4)
MCV RBC AUTO: 91 FL (ref 82–98)
MONOCYTES # BLD AUTO: 0.49 THOUSAND/ΜL (ref 0.17–1.22)
MONOCYTES NFR BLD AUTO: 8 % (ref 4–12)
NEUTROPHILS # BLD AUTO: 3.59 THOUSANDS/ΜL (ref 1.85–7.62)
NEUTS SEG NFR BLD AUTO: 59 % (ref 43–75)
NRBC BLD AUTO-RTO: 0 /100 WBCS
PLATELET # BLD AUTO: 210 THOUSANDS/UL (ref 149–390)
PMV BLD AUTO: 9.3 FL (ref 8.9–12.7)
POTASSIUM SERPL-SCNC: 3.8 MMOL/L (ref 3.5–5.3)
RBC # BLD AUTO: 4.53 MILLION/UL (ref 3.81–5.12)
SODIUM SERPL-SCNC: 140 MMOL/L (ref 136–145)
WBC # BLD AUTO: 6.15 THOUSAND/UL (ref 4.31–10.16)

## 2022-06-27 PROCEDURE — 99239 HOSP IP/OBS DSCHRG MGMT >30: CPT | Performed by: PHYSICIAN ASSISTANT

## 2022-06-27 PROCEDURE — 85025 COMPLETE CBC W/AUTO DIFF WBC: CPT | Performed by: INTERNAL MEDICINE

## 2022-06-27 PROCEDURE — 99231 SBSQ HOSP IP/OBS SF/LOW 25: CPT | Performed by: ORTHOPAEDIC SURGERY

## 2022-06-27 PROCEDURE — 83735 ASSAY OF MAGNESIUM: CPT | Performed by: INTERNAL MEDICINE

## 2022-06-27 PROCEDURE — 80048 BASIC METABOLIC PNL TOTAL CA: CPT | Performed by: INTERNAL MEDICINE

## 2022-06-27 RX ORDER — AMOXICILLIN AND CLAVULANATE POTASSIUM 875; 125 MG/1; MG/1
1 TABLET, FILM COATED ORAL EVERY 12 HOURS SCHEDULED
Qty: 10 TABLET | Refills: 0 | Status: SHIPPED | OUTPATIENT
Start: 2022-06-27 | End: 2022-07-02

## 2022-06-27 RX ADMIN — DESVENLAFAXINE 50 MG: 50 TABLET, FILM COATED, EXTENDED RELEASE ORAL at 08:40

## 2022-06-27 RX ADMIN — AMPICILLIN SODIUM AND SULBACTAM SODIUM 3 G: 2; 1 INJECTION, POWDER, FOR SOLUTION INTRAMUSCULAR; INTRAVENOUS at 05:30

## 2022-06-27 RX ADMIN — VANCOMYCIN HYDROCHLORIDE 750 MG: 5 INJECTION, POWDER, LYOPHILIZED, FOR SOLUTION INTRAVENOUS at 06:04

## 2022-06-27 RX ADMIN — ARIPIPRAZOLE 30 MG: 10 TABLET ORAL at 08:40

## 2022-06-27 RX ADMIN — NICOTINE 1 PATCH: 21 PATCH, EXTENDED RELEASE TRANSDERMAL at 08:40

## 2022-06-27 RX ADMIN — TOPIRAMATE 150 MG: 100 TABLET, FILM COATED ORAL at 08:40

## 2022-06-27 RX ADMIN — GABAPENTIN 100 MG: 100 CAPSULE ORAL at 08:40

## 2022-06-27 NOTE — DISCHARGE SUMMARY
[address] [facility] [address]  Discharge- David Patel 1970, 54 y o  female MRN: [id number]  Unit/Bed#: 410-01 Encounter: [id number]  Primary Care Provider: No primary care provider on file  Date and time admitted to hospital: 6/22/2022  8:17 PM    * Dog bite of right hand with infection  Assessment & Plan  Presented with reported dog bite to right hand, with subsequent onset of significant erythema, swelling, and pain limiting motion  · Dogs reportedly up-to-date on their shots, including rabies  · No fractures noted on x-ray of the right hand  · White blood cells elevated at 11 63 - now normal   · Patient received Boostrix in ED  · Initially on Unasyn, with vancomycin added secondary to lack of improvement  · MRI with collection within the dorsal interosseous muscle adjacent to 2nd metacarpal - may represent abscess  · Ortho consulted - hand surgeon on call with no clinical indication for immediate surgical intervention  Recommendations are for transition to oral antibiotics with Augmentin once stable, continued warm soapy water soaks 3 times a day, 15 minutes each time  Follow-up with hand surgery in [place] or with orthopedic PA Thelma Singh locally  Recommendations for follow-up within 1 week of discharge  · Significant improvement in hand swelling and movement, full ROM now of index finger  Discharge home today    Sepsis [facility])  Assessment & Plan  Based upon presentation with leukocytosis and tachycardia - skin and soft tissue infection as likely source with dog treatment as above  Sepsis remains resolved  Unfortunately blood cultures were not obtained at time of admission  Bipolar disorder [facility])  Assessment & Plan  Continue Abilify, Pristiq, and Topamax  Medical Problems             Resolved Problems  Date Reviewed: 6/26/2022   None               Discharging Physician / Practitioner: Meena Fitzpatrick PA-C  PCP: No primary care provider on file    Admission Date:   Admission Orders (From admission, onward)     Ordered        06/24/22 1517  Inpatient Admission  Once            06/22/22 2045  Place in Observation  Once                      Discharge Date: 06/27/22    Consultations During Hospital Stay:  · orthopedics    Procedures Performed:   · none    Significant Findings / Test Results:   · XR right hand  IMPRESSION:  No acute osseous abnormality  · MRI right hand  IMPRESSION:  Collection within the dorsal interosseous muscle adjacent to the 2nd metacarpal, may represent abscess  Recommend orthopedic consultation  Consider repeat MRI with contrast     Incidental Findings:   · none     Test Results Pending at Discharge (will require follow up):   · none     Outpatient Tests Requested:  · none    Complications:  none    Reason for Admission: right hand infection due to dog bite    Hospital Course:   Shar Mills is a 54 y o  female patient who originally presented to the hospital on 6/22/2022 due to sustaining a dog bite with subsequent development of significant swelling and erythema  Patient attempted to get in between her two dogs that were fighting and ended up getting bit in her right hand  Later that evening, she noted significant swelling  The next day, the hand was erythematous and hot to touch with difficulty with ROM so she came to the ED  Initiated on Unasyn  Unfortunately after two days of Unasyn, wound appeared worsened and erythema extending to right forearm  MRI completed and showed suspicion of abscess but no contrast was utilized  Therefore, she was initiated on vancomycin  Orthopedics consulted and no emergent hand surgery recommended  Warm soaks performed and wound ended up self expressing  After this, there was significant improvement in patient's hand  She now has full ROM of the hand and fingers and swelling is now minimal to resolved  Please see above list of diagnoses and related plan for additional information       Condition at Discharge: stable    Discharge Day Visit / Exam:   Subjective:  Reports significant improvement in her right hand and wants to go home at this time  Knows plan for followup   Vitals: Blood Pressure: 107/68 (06/27/22 0739)  Pulse: 83 (06/27/22 0739)  Temperature: 97 8 °F (36 6 °C) (06/27/22 0739)  Temp Source: Oral (06/26/22 0705)  Respirations: 16 (06/27/22 0739)  Height: 5' 3" (160 cm) (06/22/22 2019)  Weight - Scale: 63 5 kg (139 lb 15 9 oz) (06/27/22 0615)  SpO2: 99 % (06/27/22 0739)  Exam:   Physical Exam  Vitals reviewed  Constitutional:       General: She is not in acute distress  Appearance: She is normal weight  She is not ill-appearing  Cardiovascular:      Rate and Rhythm: Normal rate and regular rhythm  Pulses: Normal pulses  Heart sounds: Normal heart sounds  No murmur heard  No gallop  Pulmonary:      Effort: Pulmonary effort is normal       Breath sounds: Normal breath sounds  No wheezing, rhonchi or rales  Abdominal:      Tenderness: There is no abdominal tenderness  There is no guarding or rebound  Musculoskeletal:      Cervical back: Normal range of motion and neck supple  Right lower leg: No edema  Left lower leg: No edema  Comments: Full ROM of hand and fingers of right hand   Skin:     General: Skin is warm and dry  Comments: Right hand swelling significantly reduced  Mild induration superior to the wound likely small pocket of purulent material     Neurological:      General: No focal deficit present  Mental Status: She is alert and oriented to person, place, and time  Psychiatric:         Mood and Affect: Mood normal          Behavior: Behavior normal           Discussion with Family: Patient declined call to   Discharge instructions/Information to patient and family:   See after visit summary for information provided to patient and family        Provisions for Follow-Up Care:  See after visit summary for information related to follow-up care and any pertinent home health orders  Disposition:   Home    Planned Readmission: none     Discharge Statement:  I spent 45 minutes discharging the patient  This time was spent on the day of discharge  I had direct contact with the patient on the day of discharge  Greater than 50% of the total time was spent examining patient, answering all patient questions, arranging and discussing plan of care with patient as well as directly providing post-discharge instructions  Additional time then spent on discharge activities  Discharge Medications:  See after visit summary for reconciled discharge medications provided to patient and/or family        **Please Note: This note may have been constructed using a voice recognition system**

## 2022-06-27 NOTE — PROGRESS NOTES
Progress Note - Orthopedics   Andrew Harris 54 y o  female MRN: [id number]  Unit/Bed#: 410-01      Subjective:    54 y  o female status post dog bite to the right hand notes less pain and increased range of motion of fingers  She also notes less swelling  She wants to go home  Denies fevers or chills      Labs:  0   Lab Value Date/Time    HCT 41 1 06/27/2022 0410    HCT 42 1 06/26/2022 0537    HCT 42 0 06/25/2022 0441    HGB 14 0 06/27/2022 0410    HGB 14 2 06/26/2022 0537    HGB 14 2 06/25/2022 0441    WBC 6 15 06/27/2022 0410    WBC 4 64 06/26/2022 0537    WBC 6 03 06/25/2022 0441     Meds:    Current Facility-Administered Medications:     acetaminophen (TYLENOL) tablet 650 mg, 650 mg, Oral, Q4H PRN, Sunshine Mejia PA-C, 650 mg at 06/23/22 1019    ampicillin-sulbactam (UNASYN) 3 g in sodium chloride 0 9 % 100 mL IVPB, 3 g, Intravenous, Q6H, Chirag Estrella PA-C, Last Rate: 200 mL/hr at 06/27/22 0530, 3 g at 06/27/22 0530    ARIPiprazole (ABILIFY) tablet 30 mg, 30 mg, Oral, Daily, Sunshine Mejia PA-C, 30 mg at 06/27/22 0840    desvenlafaxine succinate (PRISTIQ) 24 hr tablet 50 mg, 50 mg, Oral, Daily, Sunshine Mejia PA-C, 50 mg at 06/27/22 0840    gabapentin (NEURONTIN) capsule 100 mg, 100 mg, Oral, BID, Toni Doan PA-C, 100 mg at 06/27/22 0840    heparin (porcine) subcutaneous injection 5,000 Units, 5,000 Units, Subcutaneous, Q8H [address], 5,000 Units at 06/26/22 2213 **AND** [CANCELED] Platelet count, , , Once, Sunshine Mejia PA-C    hydrOXYzine HCL (ATARAX) tablet 50 mg, 50 mg, Oral, Q6H PRN, Sunshine Mejia PA-C    LORazepam (ATIVAN) tablet 1 mg, 1 mg, Oral, 60 Min Pre-Op, Marie Arguello PA-C, 1 mg at 06/24/22 1709    morphine injection 2 mg, 2 mg, Intravenous, Q1H PRN, Sunshine Mejia PA-C    nicotine (NICODERM CQ) 21 mg/24 hr TD 24 hr patch 1 patch, 1 patch, Transdermal, Daily, Sunshine Mejia PA-C, 1 patch at 06/27/22 0840    ondansetron (Tiffanie Jose F) injection 4 mg, 4 mg, Intravenous, Q6H PRN, Olga Soto PA-C    oxyCODONE (ROXICODONE) immediate release tablet 10 mg, 10 mg, Oral, Q4H PRN, SHERRELL Zamorano-C, 10 mg at 06/25/22 1250    oxyCODONE (ROXICODONE) IR tablet 5 mg, 5 mg, Oral, Q4H PRN, SHERRELL Zamorano-ANIVAL, 5 mg at 06/24/22 1238    topiramate (TOPAMAX) tablet 150 mg, 150 mg, Oral, Daily, SHERRELL Zamorano-ANIVAL, 150 mg at 06/27/22 0840    vancomycin (VANCOCIN) 750 mg in sodium chloride 0 9 % 250 mL IVPB, 750 mg, Intravenous, Q12H, Patricia Pena PA-C, Last Rate: 250 mL/hr at 06/27/22 0604, 750 mg at 06/27/22 0604    Blood Culture:   No results found for: BLOODCX    Wound Culture:   No results found for: WOUNDCULT    Ins and Outs:  I/O last 24 hours: In: 960 [P O :960]  Out: -     Physical:  Vitals:    06/27/22 0739   BP: 107/68   Pulse: 83   Resp: 16   Temp: 97 8 °F (36 6 °C)   SpO2: 99%     Musculoskeletal: right Upper Extremity  · Skin with healing puncture dorsum of the right hand with significantly decreased swelling of the dorsum of the hand  There is no longer any erythema on the palmar aspect or dorsally  She has pain-free range of motion of the wrist and elbow  She is able to extend her fingers and clenched her fingers completely  · Minimal tenderness with palpation of the puncture site  No active discharge  No fluctuance  · SILT m/r/u  Motor intact ain/pin/m/r/u, 2+ radial pulse    Assessment:    54 y  o female dog bite to the right hand and resolving cellulitis with greatly improved swelling and no further erythema and greatly improved range of motion of the fingers  Plan:  · Weight-bearing as tolerated right hand  · Elevate right hand  · Okay for discharge home from orthopedic standpoint but must go home on oral antibiotics for 1 week  · Follow-up in orthopedic office in [facility] next week    · Continue warm soapy water soaks b i d   · Dispo: Ok for discharge from [address] [place] [address], [place]

## 2022-06-27 NOTE — ASSESSMENT & PLAN NOTE
Presented with reported dog bite to right hand, with subsequent onset of significant erythema, swelling, and pain limiting motion  · Dogs reportedly up-to-date on their shots, including rabies  · No fractures noted on x-ray of the right hand  · White blood cells elevated at 11 63 - now normal   · Patient received Boostrix in ED  · Initially on Unasyn, with vancomycin added secondary to lack of improvement  · MRI with collection within the dorsal interosseous muscle adjacent to 2nd metacarpal - may represent abscess  · Ortho consulted - hand surgeon on call with no clinical indication for immediate surgical intervention  Recommendations are for transition to oral antibiotics with Augmentin once stable, continued warm soapy water soaks 3 times a day, 15 minutes each time  Follow-up with hand surgery in [facility] or with orthopedic PA Michael Hein locally  Recommendations for follow-up within 1 week of discharge  · Significant improvement in hand swelling and movement, full ROM now of index finger   Discharge home today

## 2022-06-27 NOTE — PLAN OF CARE
Problem: PAIN - ADULT  Goal: Verbalizes/displays adequate comfort level or baseline comfort level  Description: Interventions:  - Encourage patient to monitor pain and request assistance  - Assess pain using appropriate pain scale  - Administer analgesics based on type and severity of pain and evaluate response  - Implement non-pharmacological measures as appropriate and evaluate response  - Consider cultural and social influences on pain and pain management  - Notify physician/advanced practitioner if interventions unsuccessful or patient reports new pain  Outcome: Progressing     Problem: INFECTION - ADULT  Goal: Absence or prevention of progression during hospitalization  Description: INTERVENTIONS:  - Assess and monitor for signs and symptoms of infection  - Monitor lab/diagnostic results  - Monitor all insertion sites, i e  indwelling lines, tubes, and drains  - Monitor endotracheal if appropriate and nasal secretions for changes in amount and color  - Anna appropriate cooling/warming therapies per order  - Administer medications as ordered  - Instruct and encourage patient and family to use good hand hygiene technique  - Identify and instruct in appropriate isolation precautions for identified infection/condition  Outcome: Progressing     Problem: SAFETY ADULT  Goal: Patient will remain free of falls  Description: INTERVENTIONS:  - Educate patient/family on patient safety including physical limitations  - Instruct patient to call for assistance with activity   - Consult OT/PT to assist with strengthening/mobility   - Keep Call bell within reach  - Keep bed low and locked with side rails adjusted as appropriate  - Keep care items and personal belongings within reach  - Initiate and maintain comfort rounds  - Make Fall Risk Sign visible to staff  - Offer Toileting every 1-2 Hours, in advance of need  - Initiate/Maintain bed and chair alarm  - Obtain necessary fall risk management equipment: fall socks and call bell within reach  - Apply yellow socks and bracelet for high fall risk patients  - Consider moving patient to room near nurses station  Outcome: Progressing

## 2022-06-27 NOTE — UTILIZATION REVIEW
Continued Stay Review    Date: 10- 25-26-22                      Current Patient Class: inpatient  Current Level of Care: med surg     HPI:51 y o  female initially admitted on 6-24-22 for dog bite of right hand with infection     Assessment/Plan:     6-24-22  Treatment plan includes iv unasyn  And warm soaks  Erythema and swelling extended now to elbow  MRI of hand and forearm ordered and pending  6-25-22  · MRI with collection within the dorsal interosseous muscle adjacent to 2nd metacarpal - may represent abscess  Hand surgeon conferred with Hospitalist   No clinical indication for surgical intervention  Continue warm soaks and iv antibiotics  6-26-22   Continue iv ampicillin and warm soaks  Improving pain, swelling  And erythema            Vital Signs:     Date/Time Temp Pulse Resp BP MAP (mmHg) SpO2 O2 Device   06/26/22 21:27:33 -- 90 17 113/69 84 97 % --   06/26/22 14:59:51 97 5 °F (36 4 °C) 86 18 94/65 75 98 % --   06/26/22 07:05:20 97 5 °F (36 4 °C) 92 18 94/67 76 96 % None (Room air)   06/26/22 0017 97 8 °F (36 6 °C) 91 17 105/67  80 96 % None (Room air)   06/25/22 1300 98 1 °F (36 7 °C) 88 17 107/75 86 98 % None (Room air)   06/25/22 0700 97 2 °F (36 2 °C)   Abnormal  92 15 106/73 89 98 % None (Room air)   06/25/22 0100 98 4 °F (36 9 °C) 110   Abnormal  -- 116/78 -- 97 % None (Room air)             Pertinent Labs/Diagnostic Results:       Results from last 7 days   Lab Units 06/27/22  0410 06/26/22  0537 06/25/22  0441 06/24/22  1001 06/23/22  0629   WBC Thousand/uL 6 15 4 64 6 03 6 50 8 26   HEMOGLOBIN g/dL 14 0 14 2 14 2 15 6* 13 3   HEMATOCRIT % 41 1 42 1 42 0 46 4* 38 5   PLATELETS Thousands/uL 210 195 183 166 175   NEUTROS ABS Thousands/µL 3 59 2 37 3 29 4 63 5 72         Results from last 7 days   Lab Units 06/27/22  0410 06/26/22  0537 06/25/22  0441 06/24/22  1001 06/23/22  0629   SODIUM mmol/L 140 138 137 137 138   POTASSIUM mmol/L 3 8 3 9 3 9 3 9 4 0   CHLORIDE mmol/L 109* 106 107 104 108   CO2 mmol/L 20* 20* 21 22 21   ANION GAP mmol/L 11 12 9 11 9   BUN mg/dL 12 16 14 14 15   CREATININE mg/dL 0 87 1 01 0 88 1 09 1 11   EGFR ml/min/1 73sq m 78 65 77 59 58   CALCIUM mg/dL 8 9 8 8 8 9 9 4 8 6   MAGNESIUM mg/dL 2 1 2 3 2 4  --   --    PHOSPHORUS mg/dL  --   --  3 3  --   --      Results from last 7 days   Lab Units 06/23/22  0629   AST U/L 24   ALT U/L 26   ALK PHOS U/L 61   TOTAL PROTEIN g/dL 6 6   ALBUMIN g/dL 3 1*   TOTAL BILIRUBIN mg/dL 0 58         Results from last 7 days   Lab Units 06/27/22  0410 06/26/22  0537 06/25/22  0441 06/24/22  1001 06/23/22  0629 06/22/22  2053   GLUCOSE RANDOM mg/dL 107 101 97 114 110 100       Scheduled Medications:    ampicillin-sulbactam, 3 g, Intravenous, Q6H  ARIPiprazole, 30 mg, Oral, Daily  desvenlafaxine succinate, 50 mg, Oral, Daily  gabapentin, 100 mg, Oral, BID  heparin (porcine), 5,000 Units, Subcutaneous, Q8H VAIBHAV  LORazepam, 1 mg, Oral, 60 Min Pre-Op  nicotine, 1 patch, Transdermal, Daily  topiramate, 150 mg, Oral, Daily  IVPB builder, 750 mg, Intravenous, Q12H      Continuous IV Infusions:     PRN Meds:  acetaminophen, 650 mg, Oral, Q4H PRN  hydrOXYzine HCL, 50 mg, Oral, Q6H PRN  morphine injection, 2 mg, Intravenous, Q1H PRN  ondansetron, 4 mg, Intravenous, Q6H PRN  oxyCODONE, 10 mg, Oral, Q4H PRN  oxyCODONE, 5 mg, Oral, Q4H PRN        Discharge Plan: to be determined     Network Utilization Review Department  ATTENTION: Please call with any questions or concerns to [phone] and carefully listen to the prompts so that you are directed to the right person  All voicemails are confidential   Saeed Delude all requests for admission clinical reviews, approved or denied determinations and any other requests to dedicated fax number below belonging to the campus where the patient is receiving treatment   List of dedicated fax numbers for the Facilities:  FACILITY NAME UR FAX NUMBER   ADMISSION DENIALS (Administrative/Medical Necessity) [phone]   [facility] [address] (Maternity/NICU/Pediatrics) 261 [facility],7[facility] Floor [facility] [address] [place] [address]  [phone]   Shweta Sarkar 50 [address] [facility] [place] [facility] [address] [id number] [place] [address] [address] [facility] [place] [address] [phone]

## 2022-06-27 NOTE — UTILIZATION REVIEW
Inpatient Admission Authorization Request   NOTIFICATION OF INPATIENT ADMISSION/INPATIENT AUTHORIZATION REQUEST   SERVICING FACILITY:   [address] [place] [address]  [address]  Tax ID:  [id number]  NPI: [id number]  Place of Service: Inpatient [address] [facility]  60[address]  Place of Service Code: 24     ATTENDING PROVIDER:  Attending Name and NPI#: Whitney Zepedanasima [place] [[id number]]  Address: [address]  Phone: [phone]     UTILIZATION REVIEW CONTACT:  Natalie Smith Utilization   Network Utilization Review Department  Phone: [phone]  Fax [phone]  Email: [email or web address]     PHYSICIAN ADVISORY SERVICES:  FOR [id number] REVIEW - MEDICAL NECESSITY DENIAL  Phone: [phone]  Fax: [phone]  Email: [email or web address]     TYPE OF REQUEST:  Inpatient Status     ADMISSION INFORMATION:  ADMISSION DATE/TIME: 6/24/22  3:17 PM  PATIENT DIAGNOSIS CODE/DESCRIPTION:  Cellulitis [L03 90]  Animal bite [T14  8XXA]  DISCHARGE DATE/TIME: 6/27/2022 10:50 AM   IMPORTANT INFORMATION:  Please contact Natalie Elizalde (Jenice Serene) directly with any questions or concerns regarding this request  Department voicemails are confidential     Send requests for admission clinical reviews, concurrent reviews, approvals, and administrative denials due to lack of clinical to fax [phone]

## 2022-06-27 NOTE — PROGRESS NOTES
Vancomycin IV Pharmacy-to-Dose Consultation    Zuleika Moore is a 54 y o  female who is currently receiving Vancomycin IV with management by the Pharmacy Consult service  Assessment/Plan:  The patient was reviewed  Renal function is stable and no signs or symptoms of nephrotoxicity and/or infusion reactions were documented in the chart  Based on todays assessment, continue current vancomycin (day # 4) dosing of 750mg q12h, with a plan for trough to be drawn at 0500 on 6/28/22  We will continue to follow the patients culture results and clinical progress daily      Jordan Angelo, Pharmacist

## 2022-06-27 NOTE — PLAN OF CARE
Problem: PAIN - ADULT  Goal: Verbalizes/displays adequate comfort level or baseline comfort level  Description: Interventions:  - Encourage patient to monitor pain and request assistance  - Assess pain using appropriate pain scale  - Administer analgesics based on type and severity of pain and evaluate response  - Implement non-pharmacological measures as appropriate and evaluate response  - Consider cultural and social influences on pain and pain management  - Notify physician/advanced practitioner if interventions unsuccessful or patient reports new pain  Outcome: Progressing     Problem: INFECTION - ADULT  Goal: Absence or prevention of progression during hospitalization  Description: INTERVENTIONS:  - Assess and monitor for signs and symptoms of infection  - Monitor lab/diagnostic results  - Monitor all insertion sites, i e  indwelling lines, tubes, and drains  - Monitor endotracheal if appropriate and nasal secretions for changes in amount and color  - [place] appropriate cooling/warming therapies per order  - Administer medications as ordered  - Instruct and encourage patient and family to use good hand hygiene technique  - Identify and instruct in appropriate isolation precautions for identified infection/condition  Outcome: Progressing     Problem: SAFETY ADULT  Goal: Patient will remain free of falls  Description: INTERVENTIONS:  - Educate patient/family on patient safety including physical limitations  - Instruct patient to call for assistance with activity   - Consult OT/PT to assist with strengthening/mobility   - Keep Call bell within reach  - Keep bed low and locked with side rails adjusted as appropriate  - Keep care items and personal belongings within reach  - Initiate and maintain comfort rounds  - Make Fall Risk Sign visible to staff  - Offer Toileting every 1-2 Hours, in advance of need  - Initiate/Maintain bed and chair alarm  - Obtain necessary fall risk management equipment: fall socks and call bell within reach  - Apply yellow socks and bracelet for high fall risk patients  - Consider moving patient to room near nurses station  Outcome: Progressing  Goal: Maintain or return to baseline ADL function  Description: INTERVENTIONS:  -  Assess patient's ability to carry out ADLs; assess patient's baseline for ADL function and identify physical deficits which impact ability to perform ADLs (bathing, care of mouth/teeth, toileting, grooming, dressing, etc )  - Assess/evaluate cause of self-care deficits   - Assess range of motion  - Assess patient's mobility; develop plan if impaired  - Assess patient's need for assistive devices and provide as appropriate  - Encourage maximum independence but intervene and supervise when necessary  - Involve family in performance of ADLs  - Assess for home care needs following discharge   - Consider OT consult to assist with ADL evaluation and planning for discharge  - Provide patient education as appropriate  Outcome: Progressing  Goal: Maintains/Returns to pre admission functional level  Description: INTERVENTIONS:  - Perform BMAT or MOVE assessment daily    - Set and communicate daily mobility goal to care team and patient/family/caregiver  - Collaborate with rehabilitation services on mobility goals if consulted  - Perform Range of Motion 3-4 times a day  - Reposition patient every 1-2 hours    - Dangle patient 3-4 times a day  - Stand patient 3-4 times a day  - Ambulate patient 3-4 times a day  - Out of bed to chair 3-4 times a day   - Out of bed for meals 3-4 times a day  - Out of bed for toileting  - Record patient progress and toleration of activity level   Outcome: Progressing     Problem: DISCHARGE PLANNING  Goal: Discharge to home or other facility with appropriate resources  Description: INTERVENTIONS:  - Identify barriers to discharge w/patient and caregiver  - Arrange for needed discharge resources and transportation as appropriate  - Identify discharge learning needs (meds, wound care, etc )  - Arrange for interpretive services to assist at discharge as needed  - Refer to Case Management Department for coordinating discharge planning if the patient needs post-hospital services based on physician/advanced practitioner order or complex needs related to functional status, cognitive ability, or social support system  Outcome: Progressing     Problem: Knowledge Deficit  Goal: Patient/family/caregiver demonstrates understanding of disease process, treatment plan, medications, and discharge instructions  Description: Complete learning assessment and assess knowledge base    Interventions:  - Provide teaching at level of understanding  - Provide teaching via preferred learning methods  Outcome: Progressing     Problem: METABOLIC, FLUID AND ELECTROLYTES - ADULT  Goal: Electrolytes maintained within normal limits  Description: INTERVENTIONS:  - Monitor labs and assess patient for signs and symptoms of electrolyte imbalances  - Administer electrolyte replacement as ordered  - Monitor response to electrolyte replacements, including repeat lab results as appropriate  - Instruct patient on fluid and nutrition as appropriate  Outcome: Progressing  Goal: Fluid balance maintained  Description: INTERVENTIONS:  - Monitor labs   - Monitor I/O and WT  - Instruct patient on fluid and nutrition as appropriate  - Assess for signs & symptoms of volume excess or deficit  Outcome: Progressing  Goal: Glucose maintained within target range  Description: INTERVENTIONS:  - Monitor Blood Glucose as ordered  - Assess for signs and symptoms of hyperglycemia and hypoglycemia  - Administer ordered medications to maintain glucose within target range  - Assess nutritional intake and initiate nutrition service referral as needed  Outcome: Progressing     Problem: SKIN/TISSUE INTEGRITY - ADULT  Goal: Incision(s), wounds(s) or drain site(s) healing without S/S of infection  Description: INTERVENTIONS  - Assess and document dressing, incision, wound bed, drain sites and surrounding tissue  - Provide patient and family education  - Perform skin care/dressing changes every 24 hrs   Outcome: Progressing     Problem: Potential for Falls  Goal: Patient will remain free of falls  Description: INTERVENTIONS:  - Educate patient/family on patient safety including physical limitations  - Instruct patient to call for assistance with activity   - Consult OT/PT to assist with strengthening/mobility   - Keep Call bell within reach  - Keep bed low and locked with side rails adjusted as appropriate  - Keep care items and personal belongings within reach  - Initiate and maintain comfort rounds  - Make Fall Risk Sign visible to staff  - Offer Toileting every 1-2 Hours, in advance of need  - Initiate/Maintain bed and chair alarm  - Obtain necessary fall risk management equipment: fall socks and call bell within reach  - Apply yellow socks and bracelet for high fall risk patients  - Consider moving patient to room near nurses station  Outcome: Progressing

## 2022-06-27 NOTE — CASE MANAGEMENT
Case Management Discharge Planning Note    Patient name Jennifer Jackson  Location [facility] 130/470-21 MRN [id number]  : 1970 Date 2022       Current Admission Date: 2022  Current Admission Diagnosis:Dog bite of right hand with infection   Patient Active Problem List    Diagnosis Date Noted    Sepsis ([facility] ) 2022    Dog bite of right hand with infection 2022    Bipolar disorder ([facility] ) 2022      LOS (days): 3  Geometric Mean LOS (GMLOS) (days):   Days to GMLOS:     OBJECTIVE:  Risk of Unplanned Readmission Score: 9 53         Current admission status: Inpatient   Preferred Pharmacy:   [facility]/pharmacy #[id number]- [address] [address]  Phone: [phone] Fax: [phone]    Primary Care Provider: No primary care provider on file  Primary Insurance: [address] [place] [address]  Secondary Insurance:     DISCHARGE DETAILS:no discharge needs noted  AVS reviewed by nurse  All follow up providers listed

## 2022-06-29 ENCOUNTER — TELEPHONE (OUTPATIENT)
Dept: OBGYN CLINIC | Facility: HOSPITAL | Age: 52
End: 2022-06-29

## 2022-06-29 NOTE — TELEPHONE ENCOUNTER
Hello,  Please advise if the following patient can be forced onto the schedule:  Patient:  Princess Hussein  :  [birth year]  MRN:  [id number]  INSURANCE:  [employer]   Call back #:  [phone]   Reason for appointment:  Rt hand dog bite  Requested doctor/location:  Will only go to [place] but might consider [place]  I explained that they may see her & refer to hand specialist elsewhere  Thank you in advance!

## 2022-07-23 NOTE — PROGRESS NOTES
[address] [facility]  Progress Note - Jacob Martínez 1970, 54 y o  female MRN: [id number]  Unit/Bed#: 410-01 Encounter: [id number]  Primary Care Provider: No primary care provider on file  Date and time admitted to hospital: 6/22/2022  8:17 PM    * Dog bite of right hand with infection  Assessment & Plan  · Patient presented after being bit by her Dog earlier today around 10am   She reports that following the bite she developed significant erythema of the right hand as well as swelling in the dorsal portion which prevents her from being able to extend her fingers  She also reports significant pain  · She states that her dogs were up-to-date on her their shots including rabies  · No fractures noted on x-ray of the right hand performed  · White blood cells elevated at 11 63 - now wnl  · Patient administered Boostrix in ED  · ED provider discussed with Hand surgery who stated that most likely the patient's inability to move her fingers is due to the swelling and not a specific extensor injury  Recommended admission here IV abx  · Continue with Unasyn 3 g IV q 6  · Warm water soaks of right hand t i d  · Appears worsened on examination today, further limited ROM of fingers, erythema and swelling now extended to elbow  · MRI hand and forearm ordered and pending, discussed with hand surgery who agree   Will continue to remain in contact with them    Sepsis [facility])  Assessment & Plan  · POA with leukocytosis and tachycardia  · Secondary to what appears to be an infected dog bite of her right hand  · Being treated with IV abx  · No blood cultures obtained before abx initiated unfortunately  · Sepsis appears resolved at this time    Bipolar disorder ([facility] [address] )  Assessment & Plan  · Continue Abilify 30 mg oral daily  · Continue Pristiq 50 mg oral daily  · Continue Topamax 150 mg oral daily        VTE Pharmacologic Prophylaxis: VTE Score: 10 High Risk (Score >/= 5) - Pharmacological DVT Prophylaxis Ordered: Progress Note - Behavioral Health   Yessi Sinha 50 y o  female MRN: [id number]  Unit/Bed#: U 344-01 Encounter: [id number]    Assessment/Plan   Principal Problem:    Schizoaffective disorder, bipolar type ([facility] )  Active Problems:    Benign essential hypertension    Edema    Hypothyroidism    MAG (obstructive sleep apnea)    Overactive bladder    Marijuana abuse    COPD (chronic obstructive pulmonary disease) ([facility])    Class 3 severe obesity due to excess calories with serious comorbidity and body mass index (BMI) of 40 0 to 44 9 in adult [facility])    Medical clearance for psychiatric admission    GERD (gastroesophageal reflux disease)    Diarrhea    Borderline personality disorder ([place] [address] )      Recommended Treatment:   No medication changes time  Will monitor the patient and consider increasing the patient's Depakote tomorrow  Continue Wellbutrin  mg for depression  Continue Depakote 500 mg b i d  For mood stabilization  Depakote level 07/23/2022 was 41 1  Continue Latuda 60 mg b i d  For mood stabilization  Continue Topamax 100 mg b i d  For migraines  Continue with group therapy, milieu therapy and occupational therapy  Continue frequent safety checks and vitals per unit protocol  Case discussed with treatment team   Risks, benefits and possible side effects of Medications: Risks, benefits, and possible side effects of medications have been explained to the patient, who verbalizes understanding    ------------------------------------------------------------    Yessi Sinha is a 50 y o   female with a past psychiatric history of Schizoaffective disorder, anxiety, Borderline Personality Disorder, multiple prior psychiatric admissions, h/o prior SA, h/o self-injurious behavior who presented to [facility] ED due to SA via Depakote OD (reportedly 14 pills) after taking street meth and medical marijuana   Reportedly, as per ED note, the OD was triggered by missing her mother who passed away earlier this year and being estranged from her father as well as ACT  who was reportedly leaving  The patient was admitted to the inpatient psychiatric unit at 3B (signed 201) after medical clearance  At this time the patient remains on the inpatient unit undergoing medication titration, group therapy, milieu therapy for schizoaffective disorder, bipolar type  Subjective:     Per nursing report, there have been no significant changes in Blanca's behavior in last 24 hours  Per nursing report yesterday afternoon the patient was noted to be visible on the unit, cooperative with staff directions, maintained on one-to-one observation for safety and support  Yesterday evening she reproach nursing and received a p r n  Of Atarax 50 mg at 7:56 p m  For anxiety  Medication was effective  No acute events overnight  Early this morning the patient was noted to be tearful, crying in a corner, stating no one cares about me, my pain is really bad and no one is helping me  She was offered p r n  Medications to assist with her pain, but she refused them  She became increasingly agitated refused her scheduled medications as well  Security was called and the patient was given a peer on of Haldol 5 mg, Ativan 2 mg, and 50 mg of Benadryl IM for agitation and anxiety around 9:07 a m  Patient was seen and examined in her room  On examination the patient is noted to be guarded, irritable, dismissive, tearful  She was noted to be somatically preoccupied, reporting that she is experiencing severe arm pain and sternal pain that has been ongoing for days when she was transported into the hospital   She states that she is planning to marc the hospital as a result of the injuries she sustained  She states that nobody is helping me  Patient was dismissive when offered various medications to assist with the control of her pain, stating "I want to know what is causing the pain       On assessment the patient has limited enoxaparin (Lovenox)  Sequential Compression Devices Ordered  Patient Centered Rounds: I performed bedside rounds with nursing staff today  Discussions with Specialists or Other Care Team Provider: case management, hand surgery    Education and Discussions with Family / Patient: Patient declined call to   Time Spent for Care: 30 minutes  More than 50% of total time spent on counseling and coordination of care as described above  Current Length of Stay: 0 day(s)  Current Patient Status: Inpatient   Certification Statement: The patient will continue to require additional inpatient hospital stay due to IV abx  Discharge Plan: Anticipate discharge in 48-72 hrs to home  Code Status: Level 1 - Full Code    Subjective:   Patient states her hand does not feel any better, pain is worsening along with the swelling  Denies fevers or chills currently    Objective:     Vitals:   Temp (24hrs), Av °F (36 7 °C), Min:98 °F (36 7 °C), Max:98 1 °F (36 7 °C)    Temp:  [98 °F (36 7 °C)-98 1 °F (36 7 °C)] 98 °F (36 7 °C)  HR:  [103-115] 115  Resp:  [18] 18  BP: (114-119)/(72-76) 118/72  SpO2:  [98 %] 98 %  Body mass index is 26 56 kg/m²  Input and Output Summary (last 24 hours): Intake/Output Summary (Last 24 hours) at 2022 1541  Last data filed at 2022 1248  Gross per 24 hour   Intake 360 ml   Output --   Net 360 ml       Physical Exam:   Physical Exam  Vitals and nursing note reviewed  Constitutional:       General: She is not in acute distress  Appearance: She is not ill-appearing  HENT:      Head: Normocephalic and atraumatic  Cardiovascular:      Rate and Rhythm: Normal rate and regular rhythm  Pulses: Normal pulses  Heart sounds: Normal heart sounds  No murmur heard  No gallop  Pulmonary:      Effort: Pulmonary effort is normal       Breath sounds: No wheezing, rhonchi or rales        Comments: Left sided bronchial breath sounds  Abdominal:      Tenderness: There is no abdominal tenderness  There is no guarding or rebound  Musculoskeletal:      Cervical back: Normal range of motion and neck supple  Right lower leg: No edema  Left lower leg: No edema  Comments: Little to no ROM of right index finger, minimal ability to flex or extended rest of fingers  Normal ROM at wrist and elbow   Skin:     Comments: Right hand swelling worsening and now extended to right elbow  Neurological:      General: No focal deficit present  Mental Status: She is alert and oriented to person, place, and time  Psychiatric:         Mood and Affect: Mood normal          Behavior: Behavior normal           Additional Data:     Labs:  Results from last 7 days   Lab Units 06/24/22  1001   WBC Thousand/uL 6 50   HEMOGLOBIN g/dL 15 6*   HEMATOCRIT % 46 4*   PLATELETS Thousands/uL 166   NEUTROS PCT % 72   LYMPHS PCT % 21   MONOS PCT % 7   EOS PCT % 0     Results from last 7 days   Lab Units 06/24/22  1001 06/23/22  0629   SODIUM mmol/L 137 138   POTASSIUM mmol/L 3 9 4 0   CHLORIDE mmol/L 104 108   CO2 mmol/L 22 21   BUN mg/dL 14 15   CREATININE mg/dL 1 09 1 11   ANION GAP mmol/L 11 9   CALCIUM mg/dL 9 4 8 6   ALBUMIN g/dL  --  3 1*   TOTAL BILIRUBIN mg/dL  --  0 58   ALK PHOS U/L  --  61   ALT U/L  --  26   AST U/L  --  24   GLUCOSE RANDOM mg/dL 114 110                       Lines/Drains:  Invasive Devices  Report    Peripheral Intravenous Line  Duration           Peripheral IV 06/22/22 Left;Proximal;Ventral (anterior) Forearm 1 day                      Imaging: No pertinent imaging reviewed      Recent Cultures (last 7 days):         Last 24 Hours Medication List:   Current Facility-Administered Medications   Medication Dose Route Frequency Provider Last Rate    acetaminophen  650 mg Oral Q4H PRN Dana Kearns PA-C      ampicillin-sulbactam  3 g Intravenous Q6H Dontrell Marie PA-C 3 g (06/24/22 1238)    ARIPiprazole  30 mg Oral Daily Dana Kearns PA-C      cooperation with the interview, asking Cuate Mendoza should I answer questions if you're not going to help me?   At this time the patient denies SI, HI, AVH  Progress Toward Goals:  Over last 24 hours there have been no significant changes in the patient's behavior  Psychiatric Review of Systems:  Behavior over the last 24 hours: unchanged  Sleep: insomnia  Appetite: improving  Medication side effects: none verbalized  ROS: Noted to be somatically preoccupied, reporting that she is experiencing severe arm pain and sternal pain that has been ongoing for days when she was transported into the hospital  She states that nobody is helping me  Vital signs in last 24 hours:  Temp:  [97 3 °F (36 3 °C)] 97 3 °F (36 3 °C)  HR:  [72-80] 72  Resp:  [16] 16  BP: (106-129)/(55-65) 106/64    Mental Status Exam:  Appearance:  Patient is a 59-year-old overtly  female    Alert, appears stated age, casually dressed, disheveled, overweight, in no acute distress, poor eye contact   Behavior:  Calm, limited cooperation   Motor: no abnormal movements   Speech:  Soft and scant   Mood:  irritable   Affect:  constricted, irritable and tearful at times   Thought Process:  illogical, goal directed, perseverative   Thought Content: paranoid ideation, somatic preoccupation   Perceptual disturbances: denies current hallucinations and does not appear to be responding to internal stimuli at this time   Risk Potential: No active suicidal ideation, No active homicidal ideation, Potential for aggression due to impulsive behavior   Cognition: oriented to self and situation, memory grossly intact, age-appropriate attention span and concentration and cognition not formally tested   Insight:  Limited   Judgment: Limited     Current Medications:  Current Facility-Administered Medications   Medication Dose Route Frequency Provider Last Rate    albuterol  2 puff Inhalation Q6H PRN Joanna Nj MD      aluminum-magnesium desvenlafaxine succinate  50 mg Oral Daily Sunshine Mejia PA-C      gabapentin  100 mg Oral BID Sunshine Mejia PA-C      heparin (porcine)  5,000 Units Subcutaneous [facility] Toni Doan PA-C      hydrOXYzine HCL  50 mg Oral Q6H PRN Sunshine Mejia PA-C      LORazepam  1 mg Oral [address] [facility] Adelita Lewis      morphine injection  2 mg Intravenous Q1H PRN Sunshine Mejia PA-C      nicotine  1 patch Transdermal Daily Sunshine Mejia PA-C      ondansetron  4 mg Intravenous Q6H PRN Sunshine Mejia PA-C      oxyCODONE  10 mg Oral Q4H PRN Sunshine Mejia PA-C      oxyCODONE  5 mg Oral Q4H PRN Sunshine Mejia PA-C      topiramate  150 mg Oral Daily Sunshine Mejia PA-C          Today, Patient Was Seen By: Chirag Estrella PA-C    **Please Note: This note may have been constructed using a voice recognition system  ** hydroxide-simethicone  30 mL Oral Q4H PRN Therman Chew, MD      amLODIPine  5 mg Oral Daily Therman Chew, MD      artificial tear  1 application Both Eyes R7S PRN Therman Chew, MD      LORazepam  2 mg Intramuscular Q6H PRN Max 4/day Patience Torres MD      And    haloperidol lactate  2 5 mg Intramuscular Q6H PRN Max 4/day Patience Torres MD      And    benztropine  0 5 mg Intramuscular Q6H PRN Max 4/day Patience Torres MD      haloperidol lactate  5 mg Intramuscular Q4H PRN Max 4/day Therman Chew, MD      And    LORazepam  2 mg Intramuscular Q4H PRN Max 4/day Therman Chew, MD      And    benztropine  1 mg Intramuscular Q4H PRN Max 4/day Therman Chew, MD      benztropine  1 mg Intramuscular Q4H PRN Max 6/day Therman Chew, MD      benztropine  1 mg Oral Q4H PRN Max 6/day Therman Chew, MD      buPROPion  150 mg Oral Daily Patience Torres MD      chlorproMAZINE  100 mg Intramuscular Q4H PRN Max 3/day Vandana Hurry, DO      And    diphenhydrAMINE  50 mg Intramuscular Q4H PRN Max 3/day Vandana Hurry, DO      cholecalciferol  5,000 Units Oral Daily Juanan Matt, MD      colestipol  1 g Oral BID Olesya Herrera PA-C      Diclofenac Sodium  2 g Topical 4x Daily Aria Goyal PA-C      divalproex sodium  500 mg Oral Q12H Betsy Jamil MD      estradiol  0 5 mg Oral Daily Therman Chew, MD      fluticasone  2 puff Inhalation BID Olesya Herrera PA-C      furosemide  20 mg Oral Daily Therman Chew, MD      haloperidol  2 mg Oral Q4H PRN Max 6/day Therman Chew, MD      haloperidol  5 mg Oral Q6H PRN Max 4/day Therman Chew, MD      haloperidol  5 mg Oral Q4H PRN Max 4/day Therman Chew, MD      hydrOXYzine HCL  25 mg Oral Q6H PRN Max 4/day Therman Chew, MD      hydrOXYzine HCL  50 mg Oral Q4H PRN Max 4/day Therman Chew, MD      Or    LORazepam  1 mg Intramuscular Q4H PRN Jessy Leiva MD      ibuprofen  400 mg Oral Q4H PRN Jessy Leiva MD      ibuprofen  600 mg Oral Q6H PRN Jessy Leiva MD      ibuprofen  800 mg Oral Q8H PRN Jessy Leiva MD      ketotifen  1 drop Both Eyes BID Jessy Leiva MD      levothyroxine  100 mcg Oral Early Morning Jessy Leiva MD      loperamide  2 mg Oral TID PRN Jessy Leiva MD      loratadine  10 mg Oral Daily Jessy Leiva MD      LORazepam  1 mg Oral Q4H PRN Max 6/day Jessy Leiva MD      Or    LORazepam  2 mg Intramuscular Q6H PRN Max 3/day Jessy Leiva MD      losartan  50 mg Oral Daily Jessy Leiva MD      lurasidone  60 mg Oral Daily With Benoit Rhoades MD      lurasidone  60 mg Oral Daily With [address], DO      melatonin  3 mg Oral HS PRN Jessy Leiva MD      metoprolol tartrate  25 mg Oral Q12H [address] MD      oxybutynin  5 mg Oral Daily Jessy Leiva MD      pantoprazole  40 mg Oral BID [facility] Jessy Leiva MD      pilocarpine  5 mg Oral TID Jessy Leiva MD      polyethylene glycol  17 g Oral Daily PRN Jessy Leiva MD      propranolol  10 mg Oral Q8H PRN Jessy Leiva MD      rOPINIRole  0 5 mg Oral HS Jessy Leiva MD      senna-docusate sodium  1 tablet Oral Daily PRN Jessy Leiva MD      topiramate  100 mg Oral BID Jessy Leiva MD         Behavioral Health Medications: all current active meds have been reviewed  Changes as in plan section above  Laboratory results:  I have personally reviewed all pertinent laboratory/tests results    Recent Results (from the past 48 hour(s))   Valproic acid level, total    Collection Time: 07/22/22  8:17 PM   Result Value Ref Range    Valproic Acid, Total 44 1 (L) 50 - 120 ug/mL        Guzman Blanton MD

## 2022-10-11 PROBLEM — A41.9 SEPSIS (HCC): Status: RESOLVED | Noted: 2022-06-23 | Resolved: 2022-10-11

## 2023-05-04 NOTE — NURSING NOTE
Patient discharged to home  Patient VSS  IV removed with catheter tip intact, DSD and pressure applied  Patient verbalized understanding of discharge instructions  All belongings returned to patient upon discharge 
no

## 2024-10-11 ENCOUNTER — HOSPITAL ENCOUNTER (EMERGENCY)
Facility: HOSPITAL | Age: 54
Discharge: HOME | End: 2024-10-11
Attending: EMERGENCY MEDICINE
Payer: COMMERCIAL

## 2024-10-11 ENCOUNTER — APPOINTMENT (EMERGENCY)
Dept: CT IMAGING | Facility: HOSPITAL | Age: 54
End: 2024-10-11
Payer: COMMERCIAL

## 2024-10-11 VITALS
SYSTOLIC BLOOD PRESSURE: 93 MMHG | DIASTOLIC BLOOD PRESSURE: 67 MMHG | BODY MASS INDEX: 24.63 KG/M2 | TEMPERATURE: 96.9 F | RESPIRATION RATE: 17 BRPM | WEIGHT: 139 LBS | OXYGEN SATURATION: 96 % | HEART RATE: 80 BPM | HEIGHT: 63 IN

## 2024-10-11 DIAGNOSIS — M54.50 LOWER BACK PAIN: Primary | ICD-10-CM

## 2024-10-11 LAB
ALBUMIN SERPL BCG-MCNC: 4.4 G/DL (ref 3.5–5)
ALP SERPL-CCNC: 52 U/L (ref 34–104)
ALT SERPL W P-5'-P-CCNC: 11 U/L (ref 7–52)
ANION GAP SERPL CALCULATED.3IONS-SCNC: 7 MMOL/L (ref 4–13)
AST SERPL W P-5'-P-CCNC: 15 U/L (ref 5–45)
BACTERIA UR QL AUTO: ABNORMAL /HPF
BASOPHILS # BLD AUTO: 0.05 THOUSANDS/ΜL (ref 0–0.1)
BASOPHILS NFR BLD AUTO: 1 % (ref 0–1)
BILIRUB SERPL-MCNC: 0.27 MG/DL (ref 0.2–1)
BILIRUB UR QL STRIP: NEGATIVE
BUN SERPL-MCNC: 22 MG/DL (ref 5–25)
CALCIUM SERPL-MCNC: 9.6 MG/DL (ref 8.4–10.2)
CHLORIDE SERPL-SCNC: 107 MMOL/L (ref 96–108)
CLARITY UR: CLEAR
CO2 SERPL-SCNC: 23 MMOL/L (ref 21–32)
COLOR UR: YELLOW
CREAT SERPL-MCNC: 1.19 MG/DL (ref 0.6–1.3)
EOSINOPHIL # BLD AUTO: 0.55 THOUSAND/ΜL (ref 0–0.61)
EOSINOPHIL NFR BLD AUTO: 8 % (ref 0–6)
ERYTHROCYTE [DISTWIDTH] IN BLOOD BY AUTOMATED COUNT: 12.9 % (ref 11.6–15.1)
GLUCOSE SERPL-MCNC: 95 MG/DL (ref 65–140)
GLUCOSE UR STRIP-MCNC: NEGATIVE MG/DL
HCT VFR BLD AUTO: 43 % (ref 36.5–46.1)
HGB BLD-MCNC: 14.2 G/DL (ref 12–15.4)
HGB UR QL STRIP.AUTO: NEGATIVE
HYALINE CASTS #/AREA URNS LPF: ABNORMAL /LPF
IMM GRANULOCYTES # BLD AUTO: 0.01 THOUSAND/UL (ref 0–0.2)
IMM GRANULOCYTES NFR BLD AUTO: 0 % (ref 0–2)
KETONES UR STRIP-MCNC: NEGATIVE MG/DL
LEUKOCYTE ESTERASE UR QL STRIP: ABNORMAL
LIPASE SERPL-CCNC: 36 U/L (ref 11–82)
LYMPHOCYTES # BLD AUTO: 2.56 THOUSANDS/ΜL (ref 0.6–4.47)
LYMPHOCYTES NFR BLD AUTO: 35 % (ref 14–44)
MCH RBC QN AUTO: 31 PG (ref 26.8–34.3)
MCHC RBC AUTO-ENTMCNC: 33 G/DL (ref 31.4–37.4)
MCV RBC AUTO: 94 FL (ref 82–98)
MONOCYTES # BLD AUTO: 0.55 THOUSAND/ΜL (ref 0.17–1.22)
MONOCYTES NFR BLD AUTO: 8 % (ref 4–12)
MUCOUS THREADS UR QL AUTO: ABNORMAL
NEUTROPHILS # BLD AUTO: 3.59 THOUSANDS/ΜL (ref 1.85–7.62)
NEUTS SEG NFR BLD AUTO: 48 % (ref 43–75)
NITRITE UR QL STRIP: NEGATIVE
NON-SQ EPI CELLS URNS QL MICRO: ABNORMAL /HPF
NRBC BLD AUTO-RTO: 0 /100 WBCS
PH UR STRIP.AUTO: 6.5 [PH]
PLATELET # BLD AUTO: 196 THOUSANDS/UL (ref 149–390)
PMV BLD AUTO: 9.4 FL (ref 8.9–12.7)
POTASSIUM SERPL-SCNC: 3.9 MMOL/L (ref 3.5–5.3)
PROT SERPL-MCNC: 6.9 G/DL (ref 6.4–8.4)
PROT UR STRIP-MCNC: NEGATIVE MG/DL
RBC # BLD AUTO: 4.58 MILLION/UL (ref 3.88–5.12)
RBC #/AREA URNS AUTO: ABNORMAL /HPF
SODIUM SERPL-SCNC: 137 MMOL/L (ref 135–147)
SP GR UR STRIP.AUTO: 1.02 (ref 1–1.03)
UROBILINOGEN UR STRIP-ACNC: <2 MG/DL
WBC # BLD AUTO: 7.31 THOUSAND/UL (ref 4.31–10.16)
WBC #/AREA URNS AUTO: ABNORMAL /HPF

## 2024-10-11 PROCEDURE — 83690 ASSAY OF LIPASE: CPT | Performed by: EMERGENCY MEDICINE

## 2024-10-11 PROCEDURE — 99285 EMERGENCY DEPT VISIT HI MDM: CPT

## 2024-10-11 PROCEDURE — 99285 EMERGENCY DEPT VISIT HI MDM: CPT | Performed by: EMERGENCY MEDICINE

## 2024-10-11 PROCEDURE — 96374 THER/PROPH/DIAG INJ IV PUSH: CPT

## 2024-10-11 PROCEDURE — 81001 URINALYSIS AUTO W/SCOPE: CPT | Performed by: EMERGENCY MEDICINE

## 2024-10-11 PROCEDURE — 80053 COMPREHEN METABOLIC PANEL: CPT | Performed by: EMERGENCY MEDICINE

## 2024-10-11 PROCEDURE — 85025 COMPLETE CBC W/AUTO DIFF WBC: CPT | Performed by: EMERGENCY MEDICINE

## 2024-10-11 PROCEDURE — 36415 COLL VENOUS BLD VENIPUNCTURE: CPT | Performed by: EMERGENCY MEDICINE

## 2024-10-11 PROCEDURE — 74177 CT ABD & PELVIS W/CONTRAST: CPT

## 2024-10-11 RX ORDER — LIDOCAINE 50 MG/G
1 PATCH TOPICAL ONCE
Status: DISCONTINUED | OUTPATIENT
Start: 2024-10-11 | End: 2024-10-11 | Stop reason: HOSPADM

## 2024-10-11 RX ORDER — KETOROLAC TROMETHAMINE 30 MG/ML
15 INJECTION, SOLUTION INTRAMUSCULAR; INTRAVENOUS ONCE
Status: COMPLETED | OUTPATIENT
Start: 2024-10-11 | End: 2024-10-11

## 2024-10-11 RX ORDER — TOLTERODINE 4 MG/1
4 CAPSULE, EXTENDED RELEASE ORAL DAILY
COMMUNITY

## 2024-10-11 RX ORDER — NAPROXEN 500 MG/1
500 TABLET ORAL 2 TIMES DAILY WITH MEALS
Qty: 30 TABLET | Refills: 0 | Status: SHIPPED | OUTPATIENT
Start: 2024-10-11

## 2024-10-11 RX ADMIN — LIDOCAINE 5% 1 PATCH: 700 PATCH TOPICAL at 11:18

## 2024-10-11 RX ADMIN — KETOROLAC TROMETHAMINE 15 MG: 30 INJECTION, SOLUTION INTRAMUSCULAR at 11:22

## 2024-10-11 RX ADMIN — IOHEXOL 100 ML: 350 INJECTION, SOLUTION INTRAVENOUS at 12:36

## 2024-10-11 NOTE — ED PROVIDER NOTES
ED Disposition       None          Assessment & Plan   {Hyperlinks  Risk Stratification - NIHSS - HEART SCORE - Fill out sepsis note and make sure you call [phone] if severe or septic shock:[id number]}    Medical Decision Making  Amount and/or Complexity of Data Reviewed  Labs: ordered.  Radiology: ordered.    Risk  Prescription drug management.             Medications   lidocaine (LIDODERM) 5 % patch 1 patch (1 patch Topical Medication Applied 10/11/24 1118)   ketorolac (TORADOL) injection 15 mg (15 mg Intravenous Given 10/11/24 1122)       ED Risk Strat Scores                           SBIRT 20yo+      Flowsheet Row Most Recent Value   Initial Alcohol Screen: US AUDIT-C     1. How often do you have a drink containing alcohol? 0 Filed at: 10/11/2024 1042   2. How many drinks containing alcohol do you have on a typical day you are drinking?  0 Filed at: 10/11/2024 1042   3a. Male UNDER 65: How often do you have five or more drinks on one occasion? 0 Filed at: 10/11/2024 1042   3b. FEMALE Any Age, or MALE 65+: How often do you have 4 or more drinks on one occassion? 0 Filed at: 10/11/2024 1042   Audit-C Score 0 Filed at: 10/11/2024 1042   ELIZABETH: How many times in the past year have you...    Used an illegal drug or used a prescription medication for non-medical reasons? Never Filed at: 10/11/2024 1042                            History of Present Illness   {Hyperlinks  History (Med, Surg, Fam, Social) - Current Medications - Allergies  :[id number]}    Chief Complaint   Patient presents with    Flank Pain     Patient reports right flank pain beginning last night. Denies any new urinary symptoms.        No past medical history on file.   No past surgical history on file.   No family history on file.   Social History     Tobacco Use    Smoking status: Every Day     Current packs/day: 0.50     Types: Cigarettes    Smokeless tobacco: Never   Substance Use Topics    Alcohol use: Not Currently    Drug use: Never       E-Cigarette/Vaping      E-Cigarette/Vaping Substances      I have reviewed and agree with the history as documented.     HPI    54-year-old female presenting for evaluation of right lower back/flank pain.  Patient states pain started last night.  She states pain started when she was walking around.  Pain is worse with movement and better at rest.  She states pain is constant and worsening.  Denies fevers.  Denies urinary symptoms.  Denies nausea, vomiting, or diarrhea.  Denies abdominal pain.  She states she does have chronic lower back pain and sciatica but this feels different.  She she did have recent cough.  She has had prior surgery for an ectopic pregnancy 30 years ago.  Denies any other abdominal surgeries.  She did take over-the-counter meds yesterday with minimal improvement in pain.  Denies any new bowel or bladder incontinence or saddle anesthesia.  Denies numbness or weakness in her legs.    Review of Systems        Objective   {Hyperlinks  Historical Vitals - Historical Labs - Chart Review/Microbiology - Last Echo - Code Status  :[id number]}    ED Triage Vitals [10/11/24 1041]   Temperature Pulse Blood Pressure Respirations SpO2 Patient Position - Orthostatic VS   (!) 96.9 °F (36.1 °C) 95 131/91 18 99 % Sitting      Temp Source Heart Rate Source BP Location FiO2 (%) Pain Score    Temporal Monitor Right arm -- 8      Vitals      Date and Time Temp Pulse SpO2 Resp BP Pain Score FACES Pain Rating User   10/11/24 1122 -- -- -- -- -- 8 -- CK   10/11/24 1041 96.9 °F (36.1 °C) 95 99 % 18 131/91 8 -- PP            Physical Exam    Results Reviewed       Procedure Component Value Units Date/Time    UA w Reflex to Microscopic w Reflex to Culture [[id number]] Collected: 10/11/24 1152    Lab Status: No result Specimen: Urine, Clean Catch     Comprehensive metabolic panel [[id number]] Updated: 10/11/24 1128    Lab Status: No result Specimen: Blood from Arm, Left     Lipase [[id number]] Updated: 10/11/24 1128    Lab  Status: No result Specimen: Blood from Arm, Left     CBC and differential [[id number]] Updated: 10/11/24 1123    Lab Status: No result Specimen: Blood from Arm, Left             CT abdomen pelvis with contrast    (Results Pending)       Procedures    ED Medication and Procedure Management   Prior to Admission Medications   Prescriptions Last Dose Informant Patient Reported? Taking?   ARIPiprazole (ABILIFY) 30 mg tablet 10/11/2024  Yes Yes   Sig: Take 30 mg by mouth daily   desvenlafaxine succinate (PRISTIQ) 50 mg 24 hr tablet 10/11/2024  Yes Yes   Sig: Take 50 mg by mouth daily   gabapentin (NEURONTIN) 100 mg capsule Not Taking  Yes No   Sig: Take 100 mg by mouth 3 (three) times a day   Patient not taking: Reported on 10/11/2024   hydrOXYzine pamoate (VISTARIL) 50 mg capsule 10/11/2024  Yes Yes   Sig: Take 50 mg by mouth 3 (three) times a day as needed for itching   tolterodine (DETROL LA) 4 mg 24 hr capsule 10/11/2024  Yes Yes   Sig: Take 4 mg by mouth daily   topiramate (TOPAMAX) 100 mg tablet 10/11/2024  Yes Yes   Sig: Take 100 mg by mouth daily   topiramate (TOPAMAX) 50 MG tablet 10/10/2024  Yes Yes   Sig: Take 50 mg by mouth daily at bedtime as needed      Facility-Administered Medications: None     Patient's Medications   Discharge Prescriptions    No medications on file     No discharge procedures on file.  ED SEPSIS DOCUMENTATION          regular rhythm.      Pulses: Normal pulses.      Heart sounds: Normal heart sounds. No murmur heard.     No friction rub. No gallop.   Pulmonary:      Effort: Pulmonary effort is normal. No respiratory distress.      Breath sounds: Normal breath sounds. No wheezing or rales.   Abdominal:      General: There is no distension.      Palpations: Abdomen is soft.      Tenderness: There is no abdominal tenderness. There is no right CVA tenderness, left CVA tenderness, guarding or rebound.   Musculoskeletal:         General: No tenderness.      Cervical back: Neck supple.      Comments: Tenderness over the right lumbar paraspinal muscles.   Skin:     General: Skin is warm and dry.      Coloration: Skin is not pale.      Findings: No rash.   Neurological:      General: No focal deficit present.      Mental Status: She is alert and oriented to person, place, and time.      Cranial Nerves: No cranial nerve deficit.      Sensory: No sensory deficit.      Motor: No weakness.   Psychiatric:         Mood and Affect: Mood normal.         Behavior: Behavior normal.         Results Reviewed       Procedure Component Value Units Date/Time    Lipase [[id number]]  (Normal) Collected: 10/11/24 1205    Lab Status: Final result Specimen: Blood from Hand, Left Updated: 10/11/24 1226     Lipase 36 u/L     Comprehensive metabolic panel [[id number]] Collected: 10/11/24 1205    Lab Status: Final result Specimen: Blood from Hand, Left Updated: 10/11/24 1226     Sodium 137 mmol/L      Potassium 3.9 mmol/L      Chloride 107 mmol/L      CO2 23 mmol/L      ANION GAP 7 mmol/L      BUN 22 mg/dL      Creatinine 1.19 mg/dL      Glucose 95 mg/dL      Calcium 9.6 mg/dL      AST 15 U/L      ALT 11 U/L      Alkaline Phosphatase 52 U/L      Total Protein 6.9 g/dL      Albumin 4.4 g/dL      Total Bilirubin 0.27 mg/dL      eGFR --    Narrative:      Notes:     1. eGFR calculation is only valid for adults 18 years and older.  2. EGFR calculation cannot be  performed for patients who are transgender, non-binary, or whose legal sex, sex at birth, and gender identity differ.    Urine Microscopic [[id number]]  (Abnormal) Collected: 10/11/24 1152    Lab Status: Final result Specimen: Urine, Clean Catch Updated: 10/11/24 1222     RBC, UA 0-1 /hpf      WBC, UA 1-2 /hpf      Epithelial Cells Occasional /hpf      Bacteria, UA Occasional /hpf      MUCUS THREADS Occasional     Hyaline Casts, UA 0-1 /lpf     CBC and differential [[id number]]  (Abnormal) Collected: 10/11/24 1205    Lab Status: Final result Specimen: Blood from Hand, Left Updated: 10/11/24 1211     WBC 7.31 Thousand/uL      RBC 4.58 Million/uL      Hemoglobin 14.2 g/dL      Hematocrit 43.0 %      MCV 94 fL      MCH 31.0 pg      MCHC 33.0 g/dL      RDW 12.9 %      MPV 9.4 fL      Platelets 196 Thousands/uL      nRBC 0 /100 WBCs      Segmented % 48 %      Immature Grans % 0 %      Lymphocytes % 35 %      Monocytes % 8 %      Eosinophils Relative 8 %      Basophils Relative 1 %      Absolute Neutrophils 3.59 Thousands/µL      Absolute Immature Grans 0.01 Thousand/uL      Absolute Lymphocytes 2.56 Thousands/µL      Absolute Monocytes 0.55 Thousand/µL      Eosinophils Absolute 0.55 Thousand/µL      Basophils Absolute 0.05 Thousands/µL     UA w Reflex to Microscopic w Reflex to Culture [[id number]]  (Abnormal) Collected: 10/11/24 1152    Lab Status: Final result Specimen: Urine, Clean Catch Updated: 10/11/24 1209     Color, UA Yellow     Clarity, UA Clear     Specific Gravity, UA 1.020     pH, UA 6.5     Leukocytes, UA Trace     Nitrite, UA Negative     Protein, UA Negative mg/dl      Glucose, UA Negative mg/dl      Ketones, UA Negative mg/dl      Urobilinogen, UA <2.0 mg/dl      Bilirubin, UA Negative     Occult Blood, UA Negative            CT abdomen pelvis with contrast   Final Interpretation by Griffin Stewart MD (10/11 1433)      Nonobstructing right renal calculus. No hydronephrosis or obstructive uropathy.       Normal appendix.      Hepatic cirrhosis.         Workstation performed: [id number]             Procedures    ED Medication and Procedure Management   Prior to Admission Medications   Prescriptions Last Dose Informant Patient Reported? Taking?   ARIPiprazole (ABILIFY) 30 mg tablet 10/11/2024  Yes Yes   Sig: Take 30 mg by mouth daily   desvenlafaxine succinate (PRISTIQ) 50 mg 24 hr tablet 10/11/2024  Yes Yes   Sig: Take 50 mg by mouth daily   gabapentin (NEURONTIN) 100 mg capsule Not Taking  Yes No   Sig: Take 100 mg by mouth 3 (three) times a day   Patient not taking: Reported on 10/11/2024   hydrOXYzine pamoate (VISTARIL) 50 mg capsule 10/11/2024  Yes Yes   Sig: Take 50 mg by mouth 3 (three) times a day as needed for itching   tolterodine (DETROL LA) 4 mg 24 hr capsule 10/11/2024  Yes Yes   Sig: Take 4 mg by mouth daily   topiramate (TOPAMAX) 100 mg tablet 10/11/2024  Yes Yes   Sig: Take 100 mg by mouth daily   topiramate (TOPAMAX) 50 MG tablet 10/10/2024  Yes Yes   Sig: Take 50 mg by mouth daily at bedtime as needed      Facility-Administered Medications: None     Discharge Medication List as of 10/11/2024  2:47 PM        START taking these medications    Details   naproxen (Naprosyn) 500 mg tablet Take 1 tablet (500 mg total) by mouth 2 (two) times a day with meals, Starting Fri 10/11/2024, Normal           CONTINUE these medications which have NOT CHANGED    Details   ARIPiprazole (ABILIFY) 30 mg tablet Take 30 mg by mouth daily, Historical Med      desvenlafaxine succinate (PRISTIQ) 50 mg 24 hr tablet Take 50 mg by mouth daily, Historical Med      hydrOXYzine pamoate (VISTARIL) 50 mg capsule Take 50 mg by mouth 3 (three) times a day as needed for itching, Historical Med      tolterodine (DETROL LA) 4 mg 24 hr capsule Take 4 mg by mouth daily, Historical Med      !! topiramate (TOPAMAX) 100 mg tablet Take 100 mg by mouth daily, Historical Med      !! topiramate (TOPAMAX) 50 MG tablet Take 50 mg by mouth daily  at bedtime as needed, Historical Med      gabapentin (NEURONTIN) 100 mg capsule Take 100 mg by mouth 3 (three) times a day, Historical Med       !! - Potential duplicate medications found. Please discuss with provider.          ED SEPSIS DOCUMENTATION   Time reflects when diagnosis was documented in both MDM as applicable and the Disposition within this note       Time User Action Codes Description Comment    10/11/2024  2:45 PM Katiuska Juarez Add [M54.50] Lower back pain                  Katiuska Juarez MD  10/14/24 0048

## 2024-10-11 NOTE — DISCHARGE INSTRUCTIONS
[facility].     Take naproxen every 12 hours along with Tylenol every 6 hours as needed for pain.

## 2024-10-14 ENCOUNTER — NURSE TRIAGE (OUTPATIENT)
Dept: PHYSICAL THERAPY | Facility: OTHER | Age: 54
End: 2024-10-14

## 2024-10-14 DIAGNOSIS — M54.50 ACUTE RIGHT-SIDED LOW BACK PAIN WITHOUT SCIATICA: Primary | ICD-10-CM

## 2024-10-14 NOTE — TELEPHONE ENCOUNTER
Additional Information   Negative: Is this related to a work injury?   Negative: Is this related to an MVA?   Negative: Are you currently recieving homecare services?   Negative: Has the patient had unexplained weight loss?   Negative: Does the patient have a fever?   Negative: Is the patient experiencing urine retention?   Negative: Is the patient experiencing acute drop foot or paralysis?   Negative: Has the patient experienced major trauma? (fall from height, high speed collision, direct blow to spine) and is also experiencing nausea, light-headedness, or loss of consciousness?   Negative: Is the patient experiencing blood in sputum?   Negative: Is this a chronic condition?    Background - Initial Assessment  Clinical complaint: right sided lower back pain. Non radiating. No numbness or tingling. States NKI. States she has a history of back for over a year which radiates down the left leg. States this pain feels different than her usual pain.   Date of onset: 3 days  Frequency of pain: intermittent  Quality of pain: aching and throbbing    Protocols used: [facility] Protocol    [facility] was reviewed in detail and what we can provide for their back pain.  Patient is agreeable to being triaged and would like to proceed with Physical Therapy.    Referral was placed for Physical Therapy at the [place] site. Patients information was sent to the  to make evaluation appointment. Patient made aware that the PT office  will be calling to schedule the appointment.  Patient was provided with the phone number to the PT office.    No further questions and/or concerns were voiced by the patient at this time. Patient states understanding of the referral that was placed.    Referral Closed.

## 2025-07-06 ENCOUNTER — APPOINTMENT (EMERGENCY)
Dept: RADIOLOGY | Facility: HOSPITAL | Age: 55
End: 2025-07-06
Payer: COMMERCIAL

## 2025-07-06 ENCOUNTER — HOSPITAL ENCOUNTER (EMERGENCY)
Facility: HOSPITAL | Age: 55
Discharge: HOME | End: 2025-07-06
Attending: EMERGENCY MEDICINE | Admitting: EMERGENCY MEDICINE
Payer: COMMERCIAL

## 2025-07-06 VITALS
HEART RATE: 64 BPM | SYSTOLIC BLOOD PRESSURE: 132 MMHG | OXYGEN SATURATION: 96 % | WEIGHT: 156.97 LBS | BODY MASS INDEX: 27.81 KG/M2 | RESPIRATION RATE: 20 BRPM | TEMPERATURE: 98 F | DIASTOLIC BLOOD PRESSURE: 85 MMHG

## 2025-07-06 DIAGNOSIS — D64.9 ANEMIA: ICD-10-CM

## 2025-07-06 DIAGNOSIS — R60.0 BILATERAL LOWER EXTREMITY EDEMA: ICD-10-CM

## 2025-07-06 DIAGNOSIS — E87.6 HYPOKALEMIA: ICD-10-CM

## 2025-07-06 DIAGNOSIS — E87.70 FLUID OVERLOAD: Primary | ICD-10-CM

## 2025-07-06 LAB
ALBUMIN SERPL BCG-MCNC: 3.9 G/DL (ref 3.5–5)
ALP SERPL-CCNC: 52 U/L (ref 34–104)
ALT SERPL W P-5'-P-CCNC: 11 U/L (ref 7–52)
ANION GAP SERPL CALCULATED.3IONS-SCNC: 7 MMOL/L (ref 4–13)
AST SERPL W P-5'-P-CCNC: 17 U/L (ref 13–39)
BASOPHILS # BLD AUTO: 0.04 THOUSANDS/ÂΜL (ref 0–0.1)
BASOPHILS NFR BLD AUTO: 1 % (ref 0–1)
BILIRUB SERPL-MCNC: 0.33 MG/DL (ref 0.2–1)
BNP SERPL-MCNC: 149 PG/ML (ref 0–100)
BUN SERPL-MCNC: 19 MG/DL (ref 5–25)
CALCIUM SERPL-MCNC: 8.7 MG/DL (ref 8.4–10.2)
CARDIAC TROPONIN I PNL SERPL HS: 8 NG/L (ref ?–50)
CHLORIDE SERPL-SCNC: 114 MMOL/L (ref 96–108)
CO2 SERPL-SCNC: 21 MMOL/L (ref 21–32)
CREAT SERPL-MCNC: 1.2 MG/DL (ref 0.6–1.3)
EOSINOPHIL # BLD AUTO: 0.19 THOUSAND/ÂΜL (ref 0–0.61)
EOSINOPHIL NFR BLD AUTO: 3 % (ref 0–6)
ERYTHROCYTE [DISTWIDTH] IN BLOOD BY AUTOMATED COUNT: 13.9 % (ref 11.6–15.1)
GFR SERPL CREATININE-BSD FRML MDRD: 51 ML/MIN/1.73SQ M
GLUCOSE SERPL-MCNC: 118 MG/DL (ref 65–140)
HCT VFR BLD AUTO: 34.7 % (ref 34.8–46.1)
HGB BLD-MCNC: 11.4 G/DL (ref 11.5–15.4)
IMM GRANULOCYTES # BLD AUTO: 0.02 THOUSAND/UL (ref 0–0.2)
IMM GRANULOCYTES NFR BLD AUTO: 0 % (ref 0–2)
LYMPHOCYTES # BLD AUTO: 2.18 THOUSANDS/ÂΜL (ref 0.6–4.47)
LYMPHOCYTES NFR BLD AUTO: 30 % (ref 14–44)
MAGNESIUM SERPL-MCNC: 2.6 MG/DL (ref 1.9–2.7)
MCH RBC QN AUTO: 31.2 PG (ref 26.8–34.3)
MCHC RBC AUTO-ENTMCNC: 32.9 G/DL (ref 31.4–37.4)
MCV RBC AUTO: 95 FL (ref 82–98)
MONOCYTES # BLD AUTO: 0.61 THOUSAND/ÂΜL (ref 0.17–1.22)
MONOCYTES NFR BLD AUTO: 8 % (ref 4–12)
NEUTROPHILS # BLD AUTO: 4.33 THOUSANDS/ÂΜL (ref 1.85–7.62)
NEUTS SEG NFR BLD AUTO: 58 % (ref 43–75)
NRBC BLD AUTO-RTO: 0 /100 WBCS
PLATELET # BLD AUTO: 186 THOUSANDS/UL (ref 149–390)
PMV BLD AUTO: 9.6 FL (ref 8.9–12.7)
POTASSIUM SERPL-SCNC: 3.2 MMOL/L (ref 3.5–5.3)
PROT SERPL-MCNC: 6.1 G/DL (ref 6.4–8.4)
RBC # BLD AUTO: 3.65 MILLION/UL (ref 3.81–5.12)
SODIUM SERPL-SCNC: 142 MMOL/L (ref 135–147)
WBC # BLD AUTO: 7.37 THOUSAND/UL (ref 4.31–10.16)

## 2025-07-06 PROCEDURE — 71046 X-RAY EXAM CHEST 2 VIEWS: CPT

## 2025-07-06 PROCEDURE — 83880 ASSAY OF NATRIURETIC PEPTIDE: CPT

## 2025-07-06 PROCEDURE — 99284 EMERGENCY DEPT VISIT MOD MDM: CPT

## 2025-07-06 PROCEDURE — 96374 THER/PROPH/DIAG INJ IV PUSH: CPT

## 2025-07-06 PROCEDURE — 80053 COMPREHEN METABOLIC PANEL: CPT

## 2025-07-06 PROCEDURE — 83735 ASSAY OF MAGNESIUM: CPT

## 2025-07-06 PROCEDURE — 84484 ASSAY OF TROPONIN QUANT: CPT

## 2025-07-06 PROCEDURE — 36415 COLL VENOUS BLD VENIPUNCTURE: CPT

## 2025-07-06 PROCEDURE — 93005 ELECTROCARDIOGRAM TRACING: CPT

## 2025-07-06 PROCEDURE — 99285 EMERGENCY DEPT VISIT HI MDM: CPT | Performed by: EMERGENCY MEDICINE

## 2025-07-06 PROCEDURE — 85025 COMPLETE CBC W/AUTO DIFF WBC: CPT

## 2025-07-06 RX ORDER — POTASSIUM CHLORIDE 1500 MG/1
40 TABLET, EXTENDED RELEASE ORAL ONCE
Status: COMPLETED | OUTPATIENT
Start: 2025-07-06 | End: 2025-07-06

## 2025-07-06 RX ORDER — FUROSEMIDE 10 MG/ML
20 INJECTION INTRAMUSCULAR; INTRAVENOUS ONCE
Status: COMPLETED | OUTPATIENT
Start: 2025-07-06 | End: 2025-07-06

## 2025-07-06 RX ADMIN — FUROSEMIDE 20 MG: 10 INJECTION, SOLUTION INTRAMUSCULAR; INTRAVENOUS at 18:23

## 2025-07-06 RX ADMIN — POTASSIUM CHLORIDE 40 MEQ: 1500 TABLET, EXTENDED RELEASE ORAL at 17:53

## 2025-07-06 NOTE — ED ATTENDING ATTESTATION
7/6/2025  I, Jefferson Cisneros DO, saw and evaluated the patient. I have discussed the patient with Dr Sethi and agree with her findings, Plan of Care, and MDM as documented in her note, except where noted. All available labs and Radiology studies were reviewed.  I was present for key portions of any procedure(s) performed by Dr Sethi, and I was immediately available to provide assistance.  At this point I agree with the current assessment done in the Emergency Department.  I have conducted an independent evaluation of this patient. The history and physical is as follows:    55F with noted PMH presents to the emergency department with new onset of bilateral lower extremity edema present over the past 3-4d.  Edema is symmetric between the limbs.  No visible color changes in either limbs.  No paresthesias or weakness in either limbs.  No open or draining areas in the limbs.  It is not different after lying supine for any length of time (such as after sleeping vs being upright).   No history of prior symptoms.  No history of cardiac/renal/hepatic disease.  The lower extremity edema has been preceded by at least several months of intermittent chest pain which she states occurs and off and on pattern without any obvious triggers; it is particularly not exertional by her description.  She does have increasing exertional dyspnea for at least the past several months however which does occur independently of the chest pain.  No orthopnea.  No paroxysmal nocturnal dyspnea.  Has become lightheaded with mild exertion although he never syncopized while doing so.  Does have history of mixed stress and urge incontinence for which she was placed on Detrol greater than 1 year ago.  She stated that the symptoms were abated slightly after being initially placed on Detrol, but unexpectedly she has a much decreased frequency of these episodes within the past several weeks.  This correlates with decreased volume of urination in  general. No prior echocardiogram.  ROS as per HPI; otherwise negative.    General: Awake/alert/no distress.   Vital signs and nursing notes reviewed    HENT:  Normocephalic/atraumatic  External ear/hearing wnl bilaterally.    Neck:  Phonation normal with no stridor/dysphonia.  No palpable thyroid masses or thyromegaly.  No overlying skin changes.  No JVD    Cardiac:  Radial pulses 2+ bilaterally  DP/PT pulses 2+ bilaterally  RRR with s1/s2; no m/r/g  Trace bilateral lower extremity edema    Pulmonary:   No respiratory distress.  Speaks in full sentences  No accessory muscle use  Few scattered crackles in lung bases bilaterally  No wheezes/rhonchi    Abdomen:  Nondistended. Nontender. No palpable masses.  No guarding/rebound ttp.    Skin:  Warm/dry. No diaphoresis.  No visible rashes or skin changes.    Neuro:  GCS 15.  PERRLA; EOMI. Facial expressions symmetric.  Tongue/uvula midline.  Shoulder shrug equal bilaterally  Strength 5/5 in UE/LE bilaterally  Intact sensation in UE/LE bilaterally.    DDx includes but is not limited to: New cardiac failure, new renal failure, electrolyte disturbances, etc. Decreased urine output with evidence of volume overload and exertional dyspnea particular concerning for cardiac etiology leading to volume overload.  She is not in any respiratory distress.  She is not hypoxemic at present. CBC/BMP/magnesium/troponin/BNP.  Chest x-ray.  ECG.  Disposition pending    Elevation in BNP and mild decrement in renal function with new anemia.  No pulmonary edema on chest x-ray.  No requirement for oxygen support.  No respiratory distress.  Absolutely requires further investigation including cardiology consultation, but reasonable for outpatient therapy.  Parenteral furosemide administered in ED to assist with diuresis which patient still requires to some degree. Would not start furosemide on ongoing basis however until cardiology evaluation/echo.  Plan discussed with patient.  All questions  were answered to her satisfaction prior to discharge.    ED Course  ED Course as of 07/06/25 1736   Sun Jul 06, 2025   1658 ECG NSR 76 bpm   QRS 82 QTc 459  Low voltage  Normal axis  No acute ST/T changes  No Q waves  No prior for comparison  Interpreted by me   1711 CBC and differential(!)  WBC normal.  Mild anemia, decreased from prior: She does not appear to have had this previously.  Platelets normal.   1734 Magnesium  Normal   1735 Comprehensive metabolic panel(!)  Mild hypokalemia and hyperchloremia.  Normal transaminases.  Renal function has decreased compared to prior   1735 HS Troponin 0hr (reflex protocol)  Nonischemic   1735 Magnesium  Normal   1735 B-Type Natriuretic Peptide(BNP)(!)  Elevated; no prior for comparison     Airway is midline. Lungs are clear bilaterally with no evidence of pulmonary vascular congestion/focal infiltrate/pleural effusion/pneumothorax. Cardiac and mediastinal silhouettes are within normal limits. Osseous structures appear normal.      Critical Care Time  Procedures

## 2025-07-06 NOTE — DISCHARGE INSTRUCTIONS
Please follow up with your PCP as soon as possible for a visit to discuss your lab findings today.  Return to ED if you develop worsening difficulty breathing, chest pain, fevers.

## 2025-07-06 NOTE — ED PROVIDER NOTES
Time reflects when diagnosis was documented in both MDM as applicable and the Disposition within this note       Time User Action Codes Description Comment    7/6/2025  6:05 PM AwKristen weemsrashidfoluwa Add [E87.70] Fluid overload     7/6/2025  6:05 PM Awfaustina Afopefoluwa Add [R60.0] Bilateral lower extremity edema     7/6/2025  6:05 PM Awosikamilla Afopefoluwa Add [E87.6] Hypokalemia     7/6/2025  6:06 PM Awosikamilla Afopefoluwa Add [D64.9] Anemia           ED Disposition       ED Disposition   Discharge    Condition   Stable    Date/Time   Sun Jul 6, 2025  6:05 PM    Comment   Trina Land discharge to home/self care.                   Assessment & Plan       Medical Decision Making  55-year-old female presents for evaluation of bilateral lower extremity swelling.  On initial evaluation, patient awake, alert, no acute distress.  Bilateral lower extremity edema noted, up to the ankles.  No asymmetry in her calves, no swelling, no tenderness or erythema.  Differentials include but not limited to heart failure, pulmonary hypertension, ACS, pneumonia, CKD, HUMPHREY.  Doubt DVT or PE.  Will obtain labs, CXR and treat symptomatically.    See ED course for significant lab findings.  Clinical and lab findings consistent with fluid overload which could be from new onset heart failure versus worsening CKD.  On chart review, patient has not had an echo, and has no prior documented episodes of this.  Will give a dose of IV Lasix to help with fluid overload.  Given that patient is stable, not requiring oxygen, and labs are not significantly deranged, patient is stable for outpatient follow-up with cardiology.  Discussed this with patient, and she is amenable to this.  Will place referral to cardiology, and have patient follow-up with her PCP.  She is amenable to this plan.  Stable for dispo.    Amount and/or Complexity of Data Reviewed  Labs: ordered. Decision-making details documented in ED Course.  Radiology: ordered.  ECG/medicine tests:   Decision-making details documented in ED Course.    Risk  Prescription drug management.        ED Course as of 07/06/25 2111   Sun Jul 06, 2025   1704 ECG 12 lead  ED interpretation: NSR, 76 bpm.  WI interval 170 ms, QRS 82 ms, QTc 459 ms.  Normal axis.  No ST elevations, depressions, prolonged QT or ischemic changes noted.   1720 Hemoglobin(!): 11.4  Decreased from 14.2, 8 months ago. No prior evidence of anemia   1720 CBC and differential(!)  Mild anemia, otherwise within normal limits   1735 hs TnI 0hr: 8   1735 MAGNESIUM: 2.6   1735 BNP(!): 149  elevated   1735 Potassium(!): 3.2  Repleted with oral potassium.       Medications   potassium chloride (Klor-Con M20) CR tablet 40 mEq (40 mEq Oral Given 7/6/25 1753)   furosemide (LASIX) injection 20 mg (20 mg Intravenous Given 7/6/25 1823)       ED Risk Strat Scores   HEART Risk Score      Flowsheet Row Most Recent Value   Heart Score Risk Calculator    History 1 Filed at: 07/06/2025 1742   ECG 0 Filed at: 07/06/2025 1742   Age 1 Filed at: 07/06/2025 1742   Risk Factors 1 Filed at: 07/06/2025 1742   Troponin 0 Filed at: 07/06/2025 1742   HEART Score 3 Filed at: 07/06/2025 1742          HEART Risk Score      Flowsheet Row Most Recent Value   Heart Score Risk Calculator    History 1 Filed at: 07/06/2025 1742   ECG 0 Filed at: 07/06/2025 1742   Age 1 Filed at: 07/06/2025 1742   Risk Factors 1 Filed at: 07/06/2025 1742   Troponin 0 Filed at: 07/06/2025 1742   HEART Score 3 Filed at: 07/06/2025 1742                      No data recorded        SBIRT 20yo+      Flowsheet Row Most Recent Value   Initial Alcohol Screen: US AUDIT-C     1. How often do you have a drink containing alcohol? 0 Filed at: 07/06/2025 1632   2. How many drinks containing alcohol do you have on a typical day you are drinking?  0 Filed at: 07/06/2025 1632   3a. Male UNDER 65: How often do you have five or more drinks on one occasion? 0 Filed at: 07/06/2025 1632   3b. FEMALE Any Age, or MALE 65+: How  often do you have 4 or more drinks on one occassion? 0 Filed at: 07/06/2025 1632   Audit-C Score 0 Filed at: 07/06/2025 1632   ELIZABETH: How many times in the past year have you...    Used an illegal drug or used a prescription medication for non-medical reasons? Never Filed at: 07/06/2025 1632                            History of Present Illness       Chief Complaint   Patient presents with    Leg Swelling     Pt states that 3 days ago she started with bilateral leg and feet swelling.        Past Medical History[1]   Past Surgical History[2]   Family History[3]   Social History[4]   E-Cigarette/Vaping      E-Cigarette/Vaping Substances      I have reviewed and agree with the history as documented.     55-year-old female presents for evaluation of swelling.  She reports 3 days of bilateral foot and ankle swelling, she states has been progressively worsening.  Associated with exertional dyspnea, intermittent chest pain, orthopnea which has also worsened in the last few days.  Chest pain has been intermittent for several months, and exertional dyspnea has also been present for several months, but has been progressively worsening.  She also reports that she has had less urination than normal in the last 2 weeks.  Denies fever, chills, recent illness.  Of note, she does smoke 10 cigarettes a day.  Denies history of hypertension, diabetes.          Review of Systems   Respiratory:  Positive for shortness of breath.    Cardiovascular:  Positive for chest pain and leg swelling.   Gastrointestinal:  Negative for abdominal pain, diarrhea and nausea.   Genitourinary:  Positive for decreased urine volume. Negative for dysuria.   All other systems reviewed and are negative.          Objective       ED Triage Vitals [07/06/25 1630]   Temperature Pulse Blood Pressure Respirations SpO2 Patient Position - Orthostatic VS   98 °F (36.7 °C) 93 129/86 18 94 % Sitting      Temp Source Heart Rate Source BP Location FiO2 (%) Pain Score     Temporal Monitor Left arm -- 2      Vitals      Date and Time Temp Pulse SpO2 Resp BP Pain Score FACES Pain Rating User   07/06/25 1801 -- 64 96 % 20 132/85 -- -- LD   07/06/25 1700 -- 75 96 % 18 120/69 -- -- AB   07/06/25 1630 98 °F (36.7 °C) 93 94 % 18 129/86 2 -- AB            Physical Exam  Vitals and nursing note reviewed.   Constitutional:       General: She is not in acute distress.     Appearance: She is well-developed.   HENT:      Head: Normocephalic and atraumatic.      Mouth/Throat:      Mouth: Mucous membranes are moist.     Eyes:      Extraocular Movements: Extraocular movements intact.      Conjunctiva/sclera: Conjunctivae normal.      Pupils: Pupils are equal, round, and reactive to light.       Cardiovascular:      Rate and Rhythm: Normal rate and regular rhythm.      Heart sounds: No murmur heard.  Pulmonary:      Effort: Pulmonary effort is normal. No respiratory distress.      Breath sounds: Normal breath sounds.      Comments: Crackles in R mid and lower lung fields.   Abdominal:      Palpations: Abdomen is soft.      Tenderness: There is no abdominal tenderness.     Musculoskeletal:      Cervical back: Neck supple.      Right lower leg: Edema present.      Left lower leg: Edema present.      Comments: Pitting edema noted to bilateral feet and ankles. No calf swelling, erythema, or tenderness.      Skin:     General: Skin is warm and dry.      Capillary Refill: Capillary refill takes less than 2 seconds.     Neurological:      Mental Status: She is alert.     Psychiatric:         Mood and Affect: Mood normal.         Results Reviewed       Procedure Component Value Units Date/Time    HS Troponin 0hr (reflex protocol) [[id number]]  (Normal) Collected: 07/06/25 1657    Lab Status: Final result Specimen: Blood from Arm, Right Updated: 07/06/25 1730     hs TnI 0hr 8 ng/L     B-Type Natriuretic Peptide(BNP) [[id number]]  (Abnormal) Collected: 07/06/25 1657    Lab Status: Final result Specimen: Blood  from Arm, Right Updated: 07/06/25 1728      pg/mL     Comprehensive metabolic panel [[id number]]  (Abnormal) Collected: 07/06/25 1657    Lab Status: Final result Specimen: Blood from Arm, Right Updated: 07/06/25 1725     Sodium 142 mmol/L      Potassium 3.2 mmol/L      Chloride 114 mmol/L      CO2 21 mmol/L      ANION GAP 7 mmol/L      BUN 19 mg/dL      Creatinine 1.20 mg/dL      Glucose 118 mg/dL      Calcium 8.7 mg/dL      AST 17 U/L      ALT 11 U/L      Alkaline Phosphatase 52 U/L      Total Protein 6.1 g/dL      Albumin 3.9 g/dL      Total Bilirubin 0.33 mg/dL      eGFR 51 ml/min/1.73sq m     Narrative:      [employer] guidelines for Chronic Kidney Disease (CKD):     Stage 1 with normal or high GFR (GFR > 90 mL/min/1.73 square meters)    Stage 2 Mild CKD (GFR = 60-89 mL/min/1.73 square meters)    Stage 3A Moderate CKD (GFR = 45-59 mL/min/1.73 square meters)    Stage 3B Moderate CKD (GFR = 30-44 mL/min/1.73 square meters)    Stage 4 Severe CKD (GFR = 15-29 mL/min/1.73 square meters)    Stage 5 End Stage CKD (GFR <15 mL/min/1.73 square meters)  Note: GFR calculation is accurate only with a steady state creatinine    Magnesium [[id number]]  (Normal) Collected: 07/06/25 1657    Lab Status: Final result Specimen: Blood from Arm, Right Updated: 07/06/25 1725     Magnesium 2.6 mg/dL     CBC and differential [[id number]]  (Abnormal) Collected: 07/06/25 1657    Lab Status: Final result Specimen: Blood from Arm, Right Updated: 07/06/25 1705     WBC 7.37 Thousand/uL      RBC 3.65 Million/uL      Hemoglobin 11.4 g/dL      Hematocrit 34.7 %      MCV 95 fL      MCH 31.2 pg      MCHC 32.9 g/dL      RDW 13.9 %      MPV 9.6 fL      Platelets 186 Thousands/uL      nRBC 0 /100 WBCs      Segmented % 58 %      Immature Grans % 0 %      Lymphocytes % 30 %      Monocytes % 8 %      Eosinophils Relative 3 %      Basophils Relative 1 %      Absolute Neutrophils 4.33 Thousands/µL      Absolute Immature Grans  0.02 Thousand/uL      Absolute Lymphocytes 2.18 Thousands/µL      Absolute Monocytes 0.61 Thousand/µL      Eosinophils Absolute 0.19 Thousand/µL      Basophils Absolute 0.04 Thousands/µL             XR chest 2 views    (Results Pending)       Procedures    ED Medication and Procedure Management   Prior to Admission Medications   Prescriptions Last Dose Informant Patient Reported? Taking?   ARIPiprazole (ABILIFY) 30 mg tablet 7/6/2025  Yes Yes   Sig: Take 30 mg by mouth in the morning.   desvenlafaxine succinate (PRISTIQ) 50 mg 24 hr tablet 7/6/2025  Yes Yes   Sig: Take 50 mg by mouth in the morning.   gabapentin (NEURONTIN) 100 mg capsule   Yes No   Sig: Take 100 mg by mouth 3 (three) times a day   Patient not taking: Reported on 10/11/2024   hydrOXYzine pamoate (VISTARIL) 50 mg capsule 7/6/2025  Yes Yes   Sig: Take 50 mg by mouth as needed in the morning and 50 mg as needed at noon and 50 mg as needed in the evening for itching.   naproxen (Naprosyn) 500 mg tablet Not Taking  No No   Sig: Take 1 tablet (500 mg total) by mouth 2 (two) times a day with meals   Patient not taking: Reported on 7/6/2025   tolterodine (DETROL LA) 4 mg 24 hr capsule 7/6/2025  Yes Yes   Sig: Take 4 mg by mouth in the morning.   topiramate (TOPAMAX) 100 mg tablet 7/6/2025  Yes Yes   Sig: Take 100 mg by mouth in the morning.   topiramate (TOPAMAX) 50 MG tablet 7/6/2025  Yes Yes   Sig: Take 50 mg by mouth daily at bedtime as needed      Facility-Administered Medications: None     Discharge Medication List as of 7/6/2025  6:33 PM        CONTINUE these medications which have NOT CHANGED    Details   ARIPiprazole (ABILIFY) 30 mg tablet Take 30 mg by mouth in the morning., Historical Med      desvenlafaxine succinate (PRISTIQ) 50 mg 24 hr tablet Take 50 mg by mouth in the morning., Historical Med      hydrOXYzine pamoate (VISTARIL) 50 mg capsule Take 50 mg by mouth as needed in the morning and 50 mg as needed at noon and 50 mg as needed in the  evening for itching., Historical Med      tolterodine (DETROL LA) 4 mg 24 hr capsule Take 4 mg by mouth in the morning., Historical Med      !! topiramate (TOPAMAX) 100 mg tablet Take 100 mg by mouth in the morning., Historical Med      !! topiramate (TOPAMAX) 50 MG tablet Take 50 mg by mouth daily at bedtime as needed, Historical Med      gabapentin (NEURONTIN) 100 mg capsule Take 100 mg by mouth 3 (three) times a day, Historical Med      naproxen (Naprosyn) 500 mg tablet Take 1 tablet (500 mg total) by mouth 2 (two) times a day with meals, Starting Fri 10/11/2024, Normal       !! - Potential duplicate medications found. Please discuss with provider.          ED SEPSIS DOCUMENTATION   Time reflects when diagnosis was documented in both MDM as applicable and the Disposition within this note       Time User Action Codes Description Comment    7/6/2025  6:05 PM Kareem Sethi Add [E87.70] Fluid overload     7/6/2025  6:05 PM Kareem Sethi Add [R60.0] Bilateral lower extremity edema     7/6/2025  6:05 PM Kareem Sethi Add [E87.6] Hypokalemia     7/6/2025  6:06 PM Kareem Sethi Add [D64.9] Anemia                      [1] No past medical history on file.  [2] No past surgical history on file.  [3] No family history on file.  [4]   Social History  Tobacco Use    Smoking status: Every Day     Current packs/day: 0.50     Types: Cigarettes    Smokeless tobacco: Never   Substance Use Topics    Alcohol use: Not Currently    Drug use: Never        Kareem Sethi MD  07/06/25 1070

## 2025-07-07 LAB
ATRIAL RATE: 76 BPM
P AXIS: 68 DEGREES
PR INTERVAL: 170 MS
QRS AXIS: 88 DEGREES
QRSD INTERVAL: 82 MS
QT INTERVAL: 408 MS
QTC INTERVAL: 459 MS
T WAVE AXIS: 61 DEGREES
VENTRICULAR RATE: 76 BPM

## 2025-07-07 PROCEDURE — 93010 ELECTROCARDIOGRAM REPORT: CPT | Performed by: INTERNAL MEDICINE
